# Patient Record
Sex: MALE | Race: WHITE | Employment: OTHER | URBAN - METROPOLITAN AREA
[De-identification: names, ages, dates, MRNs, and addresses within clinical notes are randomized per-mention and may not be internally consistent; named-entity substitution may affect disease eponyms.]

---

## 2020-04-08 ENCOUNTER — APPOINTMENT (OUTPATIENT)
Dept: GENERAL RADIOLOGY | Age: 40
End: 2020-04-08
Payer: COMMERCIAL

## 2020-04-08 ENCOUNTER — HOSPITAL ENCOUNTER (EMERGENCY)
Age: 40
Discharge: LEFT BEFORE DISCHARGE | End: 2020-04-08
Attending: EMERGENCY MEDICINE
Payer: COMMERCIAL

## 2020-04-08 VITALS
RESPIRATION RATE: 18 BRPM | TEMPERATURE: 97.1 F | DIASTOLIC BLOOD PRESSURE: 94 MMHG | OXYGEN SATURATION: 98 % | HEART RATE: 102 BPM | SYSTOLIC BLOOD PRESSURE: 133 MMHG

## 2020-04-08 LAB
ACETAMINOPHEN LEVEL: <5 MCG/ML (ref 10–30)
ALBUMIN SERPL-MCNC: 4.2 G/DL (ref 3.5–5.2)
ALP BLD-CCNC: 67 U/L (ref 40–129)
ALT SERPL-CCNC: 47 U/L (ref 0–40)
AMPHETAMINE SCREEN, URINE: NOT DETECTED
ANION GAP SERPL CALCULATED.3IONS-SCNC: 12 MMOL/L (ref 7–16)
AST SERPL-CCNC: 24 U/L (ref 0–39)
BARBITURATE SCREEN URINE: NOT DETECTED
BASOPHILS ABSOLUTE: 0.06 E9/L (ref 0–0.2)
BASOPHILS RELATIVE PERCENT: 0.5 % (ref 0–2)
BENZODIAZEPINE SCREEN, URINE: NOT DETECTED
BILIRUB SERPL-MCNC: 0.3 MG/DL (ref 0–1.2)
BUN BLDV-MCNC: 9 MG/DL (ref 6–20)
CALCIUM SERPL-MCNC: 9.4 MG/DL (ref 8.6–10.2)
CANNABINOID SCREEN URINE: NOT DETECTED
CHLORIDE BLD-SCNC: 103 MMOL/L (ref 98–107)
CO2: 23 MMOL/L (ref 22–29)
COCAINE METABOLITE SCREEN URINE: NOT DETECTED
CREAT SERPL-MCNC: 0.8 MG/DL (ref 0.7–1.2)
EOSINOPHILS ABSOLUTE: 0.03 E9/L (ref 0.05–0.5)
EOSINOPHILS RELATIVE PERCENT: 0.2 % (ref 0–6)
ETHANOL: <10 MG/DL (ref 0–0.08)
FENTANYL SCREEN, URINE: POSITIVE
GFR AFRICAN AMERICAN: >60
GFR NON-AFRICAN AMERICAN: >60 ML/MIN/1.73
GLUCOSE BLD-MCNC: 79 MG/DL (ref 74–99)
HCT VFR BLD CALC: 42.9 % (ref 37–54)
HEMOGLOBIN: 15 G/DL (ref 12.5–16.5)
IMMATURE GRANULOCYTES #: 0.09 E9/L
IMMATURE GRANULOCYTES %: 0.7 % (ref 0–5)
LYMPHOCYTES ABSOLUTE: 1.75 E9/L (ref 1.5–4)
LYMPHOCYTES RELATIVE PERCENT: 14.2 % (ref 20–42)
Lab: ABNORMAL
MCH RBC QN AUTO: 30.2 PG (ref 26–35)
MCHC RBC AUTO-ENTMCNC: 35 % (ref 32–34.5)
MCV RBC AUTO: 86.3 FL (ref 80–99.9)
METHADONE SCREEN, URINE: NOT DETECTED
MONOCYTES ABSOLUTE: 0.84 E9/L (ref 0.1–0.95)
MONOCYTES RELATIVE PERCENT: 6.8 % (ref 2–12)
NEUTROPHILS ABSOLUTE: 9.52 E9/L (ref 1.8–7.3)
NEUTROPHILS RELATIVE PERCENT: 77.6 % (ref 43–80)
OPIATE SCREEN URINE: POSITIVE
OXYCODONE URINE: NOT DETECTED
PDW BLD-RTO: 11.5 FL (ref 11.5–15)
PHENCYCLIDINE SCREEN URINE: NOT DETECTED
PLATELET # BLD: 341 E9/L (ref 130–450)
PMV BLD AUTO: 9.3 FL (ref 7–12)
POTASSIUM SERPL-SCNC: 4.4 MMOL/L (ref 3.5–5)
RBC # BLD: 4.97 E12/L (ref 3.8–5.8)
SALICYLATE, SERUM: <0.3 MG/DL (ref 0–30)
SODIUM BLD-SCNC: 138 MMOL/L (ref 132–146)
TOTAL PROTEIN: 7 G/DL (ref 6.4–8.3)
TRICYCLIC ANTIDEPRESSANTS SCREEN SERUM: NEGATIVE NG/ML
TROPONIN: <0.01 NG/ML (ref 0–0.03)
WBC # BLD: 12.3 E9/L (ref 4.5–11.5)

## 2020-04-08 PROCEDURE — 93005 ELECTROCARDIOGRAM TRACING: CPT | Performed by: EMERGENCY MEDICINE

## 2020-04-08 PROCEDURE — 80307 DRUG TEST PRSMV CHEM ANLYZR: CPT

## 2020-04-08 PROCEDURE — 80053 COMPREHEN METABOLIC PANEL: CPT

## 2020-04-08 PROCEDURE — 99284 EMERGENCY DEPT VISIT MOD MDM: CPT

## 2020-04-08 PROCEDURE — 96360 HYDRATION IV INFUSION INIT: CPT

## 2020-04-08 PROCEDURE — 71045 X-RAY EXAM CHEST 1 VIEW: CPT

## 2020-04-08 PROCEDURE — G0480 DRUG TEST DEF 1-7 CLASSES: HCPCS

## 2020-04-08 PROCEDURE — 85025 COMPLETE CBC W/AUTO DIFF WBC: CPT

## 2020-04-08 PROCEDURE — 84484 ASSAY OF TROPONIN QUANT: CPT

## 2020-04-08 PROCEDURE — 2580000003 HC RX 258: Performed by: EMERGENCY MEDICINE

## 2020-04-08 RX ORDER — 0.9 % SODIUM CHLORIDE 0.9 %
1000 INTRAVENOUS SOLUTION INTRAVENOUS ONCE
Status: COMPLETED | OUTPATIENT
Start: 2020-04-08 | End: 2020-04-08

## 2020-04-08 RX ADMIN — SODIUM CHLORIDE 1000 ML: 9 INJECTION, SOLUTION INTRAVENOUS at 21:49

## 2020-04-09 LAB
EKG ATRIAL RATE: 94 BPM
EKG P AXIS: 17 DEGREES
EKG P-R INTERVAL: 142 MS
EKG Q-T INTERVAL: 350 MS
EKG QRS DURATION: 84 MS
EKG QTC CALCULATION (BAZETT): 437 MS
EKG R AXIS: 20 DEGREES
EKG T AXIS: 27 DEGREES
EKG VENTRICULAR RATE: 94 BPM

## 2020-04-09 PROCEDURE — 93010 ELECTROCARDIOGRAM REPORT: CPT | Performed by: INTERNAL MEDICINE

## 2021-01-03 ENCOUNTER — HOSPITAL ENCOUNTER (EMERGENCY)
Age: 41
Discharge: HOME | End: 2021-01-03
Attending: EMERGENCY MEDICINE
Payer: COMMERCIAL

## 2021-01-03 VITALS
OXYGEN SATURATION: 96 % | RESPIRATION RATE: 16 BRPM | DIASTOLIC BLOOD PRESSURE: 89 MMHG | TEMPERATURE: 98.9 F | SYSTOLIC BLOOD PRESSURE: 147 MMHG | HEART RATE: 87 BPM

## 2021-01-03 DIAGNOSIS — T40.601A OPIATE OVERDOSE, ACCIDENTAL OR UNINTENTIONAL, INITIAL ENCOUNTER (HCC): Primary | ICD-10-CM

## 2021-01-03 PROCEDURE — 99284 EMERGENCY DEPT VISIT MOD MDM: CPT

## 2021-01-03 PROCEDURE — 6360000002 HC RX W HCPCS: Performed by: EMERGENCY MEDICINE

## 2021-01-03 PROCEDURE — 96374 THER/PROPH/DIAG INJ IV PUSH: CPT

## 2021-01-03 PROCEDURE — 6370000000 HC RX 637 (ALT 250 FOR IP): Performed by: EMERGENCY MEDICINE

## 2021-01-03 PROCEDURE — 2580000003 HC RX 258: Performed by: EMERGENCY MEDICINE

## 2021-01-03 RX ORDER — CLONIDINE HYDROCHLORIDE 0.1 MG/1
0.1 TABLET ORAL ONCE
Status: COMPLETED | OUTPATIENT
Start: 2021-01-03 | End: 2021-01-03

## 2021-01-03 RX ORDER — ONDANSETRON 2 MG/ML
4 INJECTION INTRAMUSCULAR; INTRAVENOUS ONCE
Status: COMPLETED | OUTPATIENT
Start: 2021-01-03 | End: 2021-01-03

## 2021-01-03 RX ORDER — 0.9 % SODIUM CHLORIDE 0.9 %
500 INTRAVENOUS SOLUTION INTRAVENOUS ONCE
Status: COMPLETED | OUTPATIENT
Start: 2021-01-03 | End: 2021-01-03

## 2021-01-03 RX ADMIN — SODIUM CHLORIDE 500 ML: 9 INJECTION, SOLUTION INTRAVENOUS at 13:26

## 2021-01-03 RX ADMIN — CLONIDINE HYDROCHLORIDE 0.1 MG: 0.1 TABLET ORAL at 13:28

## 2021-01-03 RX ADMIN — ONDANSETRON HYDROCHLORIDE 4 MG: 2 SOLUTION INTRAMUSCULAR; INTRAVENOUS at 13:31

## 2021-01-03 NOTE — ED NOTES
Bed: HE  Expected date:   Expected time:   Means of arrival:   Comments:  Rickie East RN  01/03/21 1357

## 2021-01-03 NOTE — ED PROVIDER NOTES
[facility]  Department of Emergency Medicine   ED  Encounter Note  Admit Date/RoomTime: 1/3/2021 12:21 PM  ED Room: Mirlande Franky    NAME: Ellie Lennon  : 1980  MRN: [id number]     Chief Complaint:  Drug Overdose (overdosed in [place] parking lot, friend took him to get Narcan, woke up being combative. )    History of Present Illness       Ellie Lennon is a 45 y.o. old male who presents to the emergency department for evaluation of opiate overdose. [place] and a friend took him to a another friend's house to obtain Narcan. After he received the Narcan, he became combative and they called 911. On arrival to the ED, he states he feels as though he is withdrawing. He is currently calm and cooperative. He is very diaphoretic and complains of nausea and diffuse muscle aches. He denies suicidal or homicidal ideation. Associated Signs and Symptoms:  diaphoresis. Severity of Symptoms:   Life threatening. Amount Ingested:  unknown. Duration (of Ingestion):  hour(s). Stressors include: nothing new. Prior History of:    []   Anxiety     []   Bipolar Disorder     []   Depression     []   Cocaine Use     []   ETOH Abuse     []   Marijuana Use     []   Previous Suicide Attempt     []   Schizophrenia      Medication Compliance:  on no known medication. Quality:     Hopelessness:   No.     Terror:   No.      Hallucinations:   None. Confusion:   No.      Able to Care for Self:   Yes. Able to Control Self:   Yes. ROS   Pertinent positives and negatives are stated within HPI, all other systems reviewed and are negative. Past Medical History:  has no past medical history on file. Surgical History:  has no past surgical history on file. Social History:  reports that he has been smoking. His smokeless tobacco use includes chew. He reports current drug use. Drug: Opiates . He reports that he does not drink alcohol.     Family History: family history is not on file. Allergies: Ceclor [cefaclor]    Physical Exam   Oxygen Saturation Interpretation: Normal.        ED Triage Vitals [01/03/21 1222]   BP Temp Temp Source Pulse Resp SpO2 Height Weight   (!) 139/95 98.9 °F (37.2 °C) Temporal 95 18 98 % -- --         General Appearance:  street clothes and fair hygiene. Constitutional:   Level of Consciousness: Awake and alert. ETOH: No.          Distress: mild. Cooperativeness: cooperative. Eyes:  PERRL, EOMI, no discharge or conjunctival injection. Ears:  External ears without lesions. Throat:  Pharynx without injection, exudate, or tonsillar hypertrophy. Airway patient. Neck:  Normal ROM. Supple. Respiratory:  Clear to auscultation and breath sounds equal.  CV:  Regular rate and rhythm, normal heart sounds, without pathological murmurs, ectopy, gallops, or rubs. GI:  Abdomen Soft, nontender, good bowel sounds. No firm or pulsatile mass. Back:  No costovertebral tenderness. Integument:  Normal turgor. +diaphoretic  Lymphatics: No lymphangitis or adenopathy noted. Neurological:  Oriented x 3. Motor functions intact. Psychiatric:        Thought Process:       Coherent:  Yes. Delusions / Paranoia: no evidence of paranoia. Flight of ideas:  No.         Rambling conversation:  No.       Affect: calm. Suicidal ideation:  no suicidal ideation. Homicidal ideation:  no homicidal ideation. Perceptions:  denies any perceptual disturbance present. Insight: average. Judgement:  average. Lab / Imaging Results   (All laboratory and radiology results have been personally reviewed by myself)  Labs:  No results found for this visit on 01/03/21. Imaging: All Radiology results interpreted by Radiologist unless otherwise noted.   No orders to display       ED Course / Medical Decision Making     Medications   cloNIDine (CATAPRES) tablet 0.1 mg (0.1 mg Oral Given 1/3/21 7717) ondansetron (ZOFRAN) injection 4 mg (4 mg Intravenous Given 1/3/21 1331)   0.9 % sodium chloride bolus (0 mLs Intravenous Stopped 1/3/21 1600)        Re-Evaluations:  1/3/21      Patients condition is improving. Consultations:             None    Procedures:   none    MDM: Patient presents to the ED after accidental opiate overdose. He has stable vitals here and is not requiring oxygen. He was given clonidine, Zofran and fluids for overdose symptoms. He was monitored in the ED for several hours with no relapse. He was discharged with a prescription for Narcan. Plan of Care/Counseling:  I reviewed today's visit with the patient in addition to providing specific details for the plan of care and counseling regarding the diagnosis and prognosis. Questions are answered at this time and are agreeable with the plan. Assessment      1. Opiate overdose, accidental or unintentional, initial encounter [facility])      This patient's ED course included: a personal history and physicial examination  This patient has remained hemodynamically stable during their ED course. Plan   Discharge to home. Patient condition is stable. New Medications     Discharge Medication List as of 1/3/2021  3:21 PM      START taking these medications    Details   Naloxone HCl (NALOXONE OPIATE OVERDOSE KIT) 1 each by Nasal route once for 1 dose, Disp-1 kit, R-0Print           Electronically signed by Ander Gómez DO   DD: 1/3/21  **This report was transcribed using voice recognition software. Every effort was made to ensure accuracy; however, inadvertent computerized transcription errors may be present.   END OF PROVIDER NOTE        Ander Gómez DO  01/03/21 3607

## 2022-12-27 ENCOUNTER — HOSPITAL ENCOUNTER (EMERGENCY)
Age: 42
Discharge: HOME | End: 2022-12-27
Payer: COMMERCIAL

## 2022-12-27 ENCOUNTER — APPOINTMENT (OUTPATIENT)
Dept: ULTRASOUND IMAGING | Age: 42
End: 2022-12-27
Payer: COMMERCIAL

## 2022-12-27 VITALS
RESPIRATION RATE: 16 BRPM | DIASTOLIC BLOOD PRESSURE: 78 MMHG | WEIGHT: 175 LBS | HEART RATE: 78 BPM | OXYGEN SATURATION: 100 % | SYSTOLIC BLOOD PRESSURE: 142 MMHG | TEMPERATURE: 98.2 F | BODY MASS INDEX: 28.25 KG/M2

## 2022-12-27 DIAGNOSIS — F19.90 IVDU (INTRAVENOUS DRUG USER): ICD-10-CM

## 2022-12-27 DIAGNOSIS — R60.0 BILATERAL LOWER EXTREMITY EDEMA: Primary | ICD-10-CM

## 2022-12-27 LAB
ALBUMIN SERPL-MCNC: 3.7 G/DL (ref 3.5–5.2)
ALP BLD-CCNC: 100 U/L (ref 40–129)
ALT SERPL-CCNC: 29 U/L (ref 0–40)
ANION GAP SERPL CALCULATED.3IONS-SCNC: 9 MMOL/L (ref 7–16)
AST SERPL-CCNC: 25 U/L (ref 0–39)
BASOPHILS ABSOLUTE: 0.05 E9/L (ref 0–0.2)
BASOPHILS RELATIVE PERCENT: 0.6 % (ref 0–2)
BILIRUB SERPL-MCNC: <0.2 MG/DL (ref 0–1.2)
BUN BLDV-MCNC: 11 MG/DL (ref 6–20)
CALCIUM SERPL-MCNC: 9.1 MG/DL (ref 8.6–10.2)
CHLORIDE BLD-SCNC: 102 MMOL/L (ref 98–107)
CO2: 27 MMOL/L (ref 22–29)
CREAT SERPL-MCNC: 0.8 MG/DL (ref 0.7–1.2)
EOSINOPHILS ABSOLUTE: 0.12 E9/L (ref 0.05–0.5)
EOSINOPHILS RELATIVE PERCENT: 1.5 % (ref 0–6)
GFR SERPL CREATININE-BSD FRML MDRD: >60 ML/MIN/1.73
GLUCOSE BLD-MCNC: 122 MG/DL (ref 74–99)
HCT VFR BLD CALC: 37.7 % (ref 37–54)
HEMOGLOBIN: 13.2 G/DL (ref 12.5–16.5)
IMMATURE GRANULOCYTES #: 0.05 E9/L
IMMATURE GRANULOCYTES %: 0.6 % (ref 0–5)
LYMPHOCYTES ABSOLUTE: 3.08 E9/L (ref 1.5–4)
LYMPHOCYTES RELATIVE PERCENT: 39.6 % (ref 20–42)
MCH RBC QN AUTO: 28.9 PG (ref 26–35)
MCHC RBC AUTO-ENTMCNC: 35 % (ref 32–34.5)
MCV RBC AUTO: 82.5 FL (ref 80–99.9)
MONOCYTES ABSOLUTE: 0.62 E9/L (ref 0.1–0.95)
MONOCYTES RELATIVE PERCENT: 8 % (ref 2–12)
NEUTROPHILS ABSOLUTE: 3.85 E9/L (ref 1.8–7.3)
NEUTROPHILS RELATIVE PERCENT: 49.7 % (ref 43–80)
PDW BLD-RTO: 12.4 FL (ref 11.5–15)
PLATELET # BLD: 384 E9/L (ref 130–450)
PMV BLD AUTO: 9.4 FL (ref 7–12)
POTASSIUM SERPL-SCNC: 3.8 MMOL/L (ref 3.5–5)
RBC # BLD: 4.57 E12/L (ref 3.8–5.8)
SODIUM BLD-SCNC: 138 MMOL/L (ref 132–146)
TOTAL PROTEIN: 7.2 G/DL (ref 6.4–8.3)
WBC # BLD: 7.8 E9/L (ref 4.5–11.5)

## 2022-12-27 PROCEDURE — 85025 COMPLETE CBC W/AUTO DIFF WBC: CPT

## 2022-12-27 PROCEDURE — 36415 COLL VENOUS BLD VENIPUNCTURE: CPT

## 2022-12-27 PROCEDURE — 93970 EXTREMITY STUDY: CPT

## 2022-12-27 PROCEDURE — 80053 COMPREHEN METABOLIC PANEL: CPT

## 2022-12-27 PROCEDURE — 99284 EMERGENCY DEPT VISIT MOD MDM: CPT

## 2022-12-27 PROCEDURE — 87040 BLOOD CULTURE FOR BACTERIA: CPT

## 2022-12-27 ASSESSMENT — LIFESTYLE VARIABLES
HOW OFTEN DO YOU HAVE A DRINK CONTAINING ALCOHOL: NEVER
HOW MANY STANDARD DRINKS CONTAINING ALCOHOL DO YOU HAVE ON A TYPICAL DAY: PATIENT DOES NOT DRINK

## 2022-12-27 ASSESSMENT — PAIN DESCRIPTION - FREQUENCY: FREQUENCY: CONTINUOUS

## 2022-12-27 ASSESSMENT — PAIN SCALES - GENERAL: PAINLEVEL_OUTOF10: 10

## 2022-12-27 ASSESSMENT — PAIN DESCRIPTION - ONSET: ONSET: ON-GOING

## 2022-12-27 ASSESSMENT — PAIN DESCRIPTION - LOCATION: LOCATION: LEG

## 2022-12-27 ASSESSMENT — PAIN DESCRIPTION - PAIN TYPE: TYPE: ACUTE PAIN

## 2022-12-27 ASSESSMENT — PAIN DESCRIPTION - ORIENTATION: ORIENTATION: RIGHT;LEFT

## 2022-12-27 ASSESSMENT — PAIN DESCRIPTION - DESCRIPTORS: DESCRIPTORS: ACHING;DISCOMFORT;SORE

## 2022-12-27 ASSESSMENT — PAIN - FUNCTIONAL ASSESSMENT: PAIN_FUNCTIONAL_ASSESSMENT: 0-10

## 2022-12-27 NOTE — Clinical Note
Bayron Rose was seen and treated in our emergency department on 12/27/2022. He may return to work on 12/28/2022. If you have any questions or concerns, please don't hesitate to call.       Megha Fernandez, APRN - CNP

## 2022-12-27 NOTE — ED PROVIDER NOTES
[facility] [address] [facility]  Department of Emergency Medicine   ED  Encounter Note  Admit Date/RoomTime: 2022  1:13 PM  ED Room:     NAME: Paco Loya  : 1980  MRN: [id number]     Chief Complaint:  Leg Pain (Bilateral lower leg swelling, ongoing swelling for a few weeks, and hands have some swelling, does use IV drugs)    History of Present Illness        Paco Loya is a 40 y.o. old male who presents to the emergency department by private vehicle with complaints of a several week history of bilateral lower extremity and bilateral hand swelling with no known injury. Denies any calf pain, leg pain, pain to the hands. Of note he is an IV drug user, last injected approximately 2 days ago, reports he typically uses his feet, ankles, behind the knees, bilateral hands, arms and neck and has occasionally used his groin for injecting. He has been using IV drugs for several years. He does report that he is in treatment. He states that he does not share needles but does not always use clean needles every time nor does he clean his skin every time. He denies any fevers, chills, body aches. He has been wearing compression stockings which have helped with the swelling somewhat. Associated Signs & Symptoms:     []  Fever     []  Cough     []  Dyspnea     []  Hemoptysis     []  Chest Pain     Wells' Score Criteria for DVT: (possible score ? 2 to 9)      Active cancer (treatment within last 6 months or palliative) NO (0)     Calf swelling ?  3 cm compared to asymptomatic calf (measured 10 cm             below tibial tuberosity) NO (0)     Swollen unilateral superficial veins (non-varicose, in symptomatic leg) NO (0)     Unilateral pitting edema (in symptomatic leg): NO (0)     Previous documented DVT: NO (0)     Swelling of entire leg NO (0)      Localized tenderness along the deep venous system NO (0)     Paralysis, paresis, or recent cast immobilization of lower extremities NO (0)     Recently bedridden ? 3 days, or major surgery requiring regional or                    general anesthetic in the past 12 weeks NO (0)     Alternative diagnosis at least as likely YES (-2)     TOTAL SCORE -2     * Those with Wells scores of two or more have a 28% chance of having DVT, those with a lower score have 6% odds. ** Alternatively, Wells scores can be categorized as high if greater than two, moderate if one or two, and low if less than one, with likelihoods of 53%, 17%, and 5 respectively. ROS   Pertinent positives and negatives are stated within HPI, all other systems reviewed and are negative. Past Medical History:  has no past medical history on file. Surgical History:  has a past surgical history that includes Hip fracture surgery (Right). Social History:  reports that he has been smoking cigarettes. He started smoking about 29 years ago. He has a 25.00 pack-year smoking history. He has never used smokeless tobacco. He reports current drug use. Drugs: Opiates  and IV. He reports that he does not drink alcohol. Family History: family history is not on file. Allergies: Ceclor [cefaclor]    Physical Exam   Oxygen Saturation Interpretation: Normal.        ED Triage Vitals   BP Temp Temp src Heart Rate Resp SpO2 Height Weight   12/27/22 1057 12/27/22 1057 -- 12/27/22 1057 12/27/22 1057 12/27/22 1057 -- 12/27/22 1131   (!) 136/90 98.2 °F (36.8 °C)  85 16 99 %  175 lb (79.4 kg)         Constitutional:  Alert, development consistent with age. HEENT:  NC/NT. Airway patent. Neck:  Normal ROM. Supple. Respiratory:  Clear to auscultation and breath sounds equal.  CV:  Regular rate and rhythm, normal heart sounds, without pathological murmurs, ectopy, gallops, or rubs. GI:  Abdomen Soft, nontender, good bowel sounds. No firm or pulsatile mass. Lower Ext.:  Bilateral:              Tenderness: None. Swelling: Mild.               Deformity: no. ROM: full range of motion. Calf:  bilateral, No evidence of DVT seen on physical exam. Negative Nadir's sign. No cords or calf tenderness. No significant calf/ankle edema. .            Edema:  Trace and none Bilateral lower extremity(s). Skin: Multiple injection sites in various stages of healing to the toes, dorsal feet, ankles, calves, popliteal space. No signs or symptoms of cellulitis, no redness, no abscess formation. Distal Function:              Motor deficit: none. Sensory deficit: none. Pulse deficit: none. Capillary refill: normal.  Gait: normal  Integument:  Normal turgor. Warm, dry, without visible rash, unless noted elsewhere. Neurological:  Oriented. Motor functions intact.     Lab / Imaging Results   (All laboratory and radiology results have been personally reviewed by myself)  Labs:  Results for orders placed or performed during the hospital encounter of 12/27/22   CBC with Auto Differential   Result Value Ref Range    WBC 7.8 4.5 - 11.5 E9/L    RBC 4.57 3.80 - 5.80 E12/L    Hemoglobin 13.2 12.5 - 16.5 g/dL    Hematocrit 37.7 37.0 - 54.0 %    MCV 82.5 80.0 - 99.9 fL    MCH 28.9 26.0 - 35.0 pg    MCHC 35.0 (H) 32.0 - 34.5 %    RDW 12.4 11.5 - 15.0 fL    Platelets 430 189 - 520 E9/L    MPV 9.4 7.0 - 12.0 fL    Neutrophils % 49.7 43.0 - 80.0 %    Immature Granulocytes % 0.6 0.0 - 5.0 %    Lymphocytes % 39.6 20.0 - 42.0 %    Monocytes % 8.0 2.0 - 12.0 %    Eosinophils % 1.5 0.0 - 6.0 %    Basophils % 0.6 0.0 - 2.0 %    Neutrophils Absolute 3.85 1.80 - 7.30 E9/L    Immature Granulocytes # 0.05 E9/L    Lymphocytes Absolute 3.08 1.50 - 4.00 E9/L    Monocytes Absolute 0.62 0.10 - 0.95 E9/L    Eosinophils Absolute 0.12 0.05 - 0.50 E9/L    Basophils Absolute 0.05 0.00 - 0.20 E9/L   CMP   Result Value Ref Range    Sodium 138 132 - 146 mmol/L    Potassium 3.8 3.5 - 5.0 mmol/L    Chloride 102 98 - 107 mmol/L    CO2 27 22 - 29 mmol/L Anion Gap 9 7 - 16 mmol/L    Glucose 122 (H) 74 - 99 mg/dL    BUN 11 6 - 20 mg/dL    Creatinine 0.8 0.7 - 1.2 mg/dL    Est, Glom Filt Rate >60 >=60 mL/min/1.73    Calcium 9.1 8.6 - 10.2 mg/dL    Total Protein 7.2 6.4 - 8.3 g/dL    Albumin 3.7 3.5 - 5.2 g/dL    Total Bilirubin <0.2 0.0 - 1.2 mg/dL    Alkaline Phosphatase 100 40 - 129 U/L    ALT 29 0 - 40 U/L    AST 25 0 - 39 U/L       Imaging: All Radiology results interpreted by Radiologist unless otherwise noted. US DUP LOWER EXTREMITIES BILATERAL VENOUS   Final Result   No evidence of DVT in either lower extremity. ED Course / Medical Decision Making   Medications - No data to display     Consult(s):   None    Procedure(s):   None    MDM: This is a 45-year-old male with a past medical history of opioid dependence with long term IV drug use who presents with a several week history of bilateral lower extremity swelling and bilateral hand swelling without pain. He reports that he was put on Bactrim by the methadone clinic which she has been taking. He has not had any open areas or redness to any of his injection sites. He has not had any fevers. He denies any calf pain. On exam he does have multiple injection and skin popping sites to his feet, ankles, calves, arms, neck all without signs or symptoms of secondary infection. Ultrasound of bilateral lower extremities were negative for DVT, he has no leukocytosis noted on his CBC. At abundance of caution blood cultures were drawn and sent to lab, advised patient this may take 48 to 72 hours to return. Patient is afebrile and nontoxic in appearance, there are no signs or symptoms of a skin or soft tissue infection at this time. Patient was advised that the swelling may likely be due to vascular damage given his frequent IV drug use and poor venous return.   Patient was advised he will need to follow-up with his PCP and as he is not established information was given for the same [facility] as he may require further vascular studies should the swelling continue. In the interim he may continue to use the compression stockings and elevate his feet. He was counseled on continuing with his drug rehab program and avoidance of injection drug use. In the very least I did advise patient that should he need to continue to inject drugs he should clean the injection sites thoroughly and use clean needles every time. Patient verbalizes understanding of this and is agreeable to discharge home. Patient was explicitly instructed on specific signs and symptoms on which to return to the emergency room for. Patient was instructed to return to the ER for any new or worsening symptoms. Additional discharge instructions were given verbally. All questions were answered. Patient is comfortable and agreeable with discharge plan. Patient in no acute distress and non-toxic in appearance. Plan of Care/Counseling:  LIEN Hoang CNP reviewed today's visit with the patient in addition to providing specific details for the plan of care and counseling regarding the diagnosis and prognosis. Questions are answered at this time and are agreeable with the plan. Assessment      1. Bilateral lower extremity edema    2. IVDU (intravenous drug user)      Plan   Discharged home. Patient condition is good    New Medications     New Prescriptions    No medications on file     Electronically signed by LIEN Hoang CNP   DD: 12/27/22  **This report was transcribed using voice recognition software. Every effort was made to ensure accuracy; however, inadvertent computerized transcription errors may be present.   END OF ED PROVIDER NOTE         LIEN Hoang CNP  12/27/22 2401

## 2022-12-27 NOTE — DISCHARGE INSTRUCTIONS
Your ultrasound of your legs was negative for a blood clot, there is no sign of infection based on your blood work. Your blood cultures will be pending, you will be called if there are any positive result. The swelling in your legs is likely due to poor venous return related to the damage to the blood vessels. Please try to keep your legs elevated and you may continue to use the compression socks. Please keep up the good work with your recovery treatment.

## 2023-01-01 LAB
BLOOD CULTURE, ROUTINE: NORMAL
BLOOD CULTURE, ROUTINE: NORMAL

## 2023-02-25 ENCOUNTER — HOSPITAL ENCOUNTER (EMERGENCY)
Age: 43
Discharge: HOME | End: 2023-02-25
Attending: EMERGENCY MEDICINE
Payer: COMMERCIAL

## 2023-02-25 ENCOUNTER — APPOINTMENT (OUTPATIENT)
Dept: GENERAL RADIOLOGY | Age: 43
End: 2023-02-25
Payer: COMMERCIAL

## 2023-02-25 VITALS
TEMPERATURE: 98.3 F | RESPIRATION RATE: 18 BRPM | HEIGHT: 66 IN | WEIGHT: 175 LBS | DIASTOLIC BLOOD PRESSURE: 82 MMHG | BODY MASS INDEX: 28.12 KG/M2 | SYSTOLIC BLOOD PRESSURE: 130 MMHG | OXYGEN SATURATION: 98 % | HEART RATE: 86 BPM

## 2023-02-25 DIAGNOSIS — R07.89 ATYPICAL CHEST PAIN: Primary | ICD-10-CM

## 2023-02-25 LAB
ALBUMIN SERPL-MCNC: 4.4 G/DL (ref 3.5–5.2)
ALP BLD-CCNC: 128 U/L (ref 40–129)
ALT SERPL-CCNC: 40 U/L (ref 0–40)
ANION GAP SERPL CALCULATED.3IONS-SCNC: 11 MMOL/L (ref 7–16)
AST SERPL-CCNC: 28 U/L (ref 0–39)
BASOPHILS ABSOLUTE: 0.07 E9/L (ref 0–0.2)
BASOPHILS RELATIVE PERCENT: 1.2 % (ref 0–2)
BILIRUB SERPL-MCNC: 0.3 MG/DL (ref 0–1.2)
BUN BLDV-MCNC: 13 MG/DL (ref 6–20)
CALCIUM SERPL-MCNC: 9.2 MG/DL (ref 8.6–10.2)
CHLORIDE BLD-SCNC: 103 MMOL/L (ref 98–107)
CO2: 26 MMOL/L (ref 22–29)
CREAT SERPL-MCNC: 0.9 MG/DL (ref 0.7–1.2)
EKG ATRIAL RATE: 82 BPM
EKG P AXIS: 38 DEGREES
EKG P-R INTERVAL: 134 MS
EKG Q-T INTERVAL: 406 MS
EKG QRS DURATION: 82 MS
EKG QTC CALCULATION (BAZETT): 474 MS
EKG R AXIS: 1 DEGREES
EKG T AXIS: 25 DEGREES
EKG VENTRICULAR RATE: 82 BPM
EOSINOPHILS ABSOLUTE: 0.14 E9/L (ref 0.05–0.5)
EOSINOPHILS RELATIVE PERCENT: 2.4 % (ref 0–6)
GFR SERPL CREATININE-BSD FRML MDRD: >60 ML/MIN/1.73
GLUCOSE BLD-MCNC: 88 MG/DL (ref 74–99)
HCT VFR BLD CALC: 47 % (ref 37–54)
HEMOGLOBIN: 15.8 G/DL (ref 12.5–16.5)
IMMATURE GRANULOCYTES #: 0.02 E9/L
IMMATURE GRANULOCYTES %: 0.3 % (ref 0–5)
INR BLD: 1
LYMPHOCYTES ABSOLUTE: 2.78 E9/L (ref 1.5–4)
LYMPHOCYTES RELATIVE PERCENT: 47 % (ref 20–42)
MCH RBC QN AUTO: 28.8 PG (ref 26–35)
MCHC RBC AUTO-ENTMCNC: 33.6 % (ref 32–34.5)
MCV RBC AUTO: 85.6 FL (ref 80–99.9)
MONOCYTES ABSOLUTE: 0.5 E9/L (ref 0.1–0.95)
MONOCYTES RELATIVE PERCENT: 8.5 % (ref 2–12)
NEUTROPHILS ABSOLUTE: 2.4 E9/L (ref 1.8–7.3)
NEUTROPHILS RELATIVE PERCENT: 40.6 % (ref 43–80)
PDW BLD-RTO: 12.6 FL (ref 11.5–15)
PLATELET # BLD: 318 E9/L (ref 130–450)
PMV BLD AUTO: 9.8 FL (ref 7–12)
POTASSIUM REFLEX MAGNESIUM: 4.3 MMOL/L (ref 3.5–5)
PROTHROMBIN TIME: 11.1 SEC (ref 9.3–12.4)
RBC # BLD: 5.49 E12/L (ref 3.8–5.8)
SODIUM BLD-SCNC: 140 MMOL/L (ref 132–146)
TOTAL PROTEIN: 8.5 G/DL (ref 6.4–8.3)
TROPONIN, HIGH SENSITIVITY: 8 NG/L (ref 0–11)
TROPONIN, HIGH SENSITIVITY: <6 NG/L (ref 0–11)
WBC # BLD: 5.9 E9/L (ref 4.5–11.5)

## 2023-02-25 PROCEDURE — 85610 PROTHROMBIN TIME: CPT

## 2023-02-25 PROCEDURE — 93005 ELECTROCARDIOGRAM TRACING: CPT | Performed by: PHYSICIAN ASSISTANT

## 2023-02-25 PROCEDURE — 36415 COLL VENOUS BLD VENIPUNCTURE: CPT

## 2023-02-25 PROCEDURE — 71046 X-RAY EXAM CHEST 2 VIEWS: CPT

## 2023-02-25 PROCEDURE — 84484 ASSAY OF TROPONIN QUANT: CPT

## 2023-02-25 PROCEDURE — 80053 COMPREHEN METABOLIC PANEL: CPT

## 2023-02-25 PROCEDURE — 85025 COMPLETE CBC W/AUTO DIFF WBC: CPT

## 2023-02-25 PROCEDURE — 99285 EMERGENCY DEPT VISIT HI MDM: CPT

## 2023-02-25 RX ORDER — DOXYCYCLINE HYCLATE 100 MG
100 TABLET ORAL 2 TIMES DAILY
Qty: 14 TABLET | Refills: 0 | Status: SHIPPED | OUTPATIENT
Start: 2023-02-25 | End: 2023-03-04

## 2023-02-25 RX ORDER — ASPIRIN 81 MG/1
81 TABLET ORAL DAILY
Qty: 30 TABLET | Refills: 5 | Status: SHIPPED | OUTPATIENT
Start: 2023-02-25

## 2023-02-25 ASSESSMENT — PAIN DESCRIPTION - LOCATION: LOCATION: CHEST

## 2023-02-25 ASSESSMENT — PAIN DESCRIPTION - ORIENTATION: ORIENTATION: MID

## 2023-02-25 ASSESSMENT — PAIN SCALES - GENERAL: PAINLEVEL_OUTOF10: 6

## 2023-02-25 ASSESSMENT — PAIN - FUNCTIONAL ASSESSMENT: PAIN_FUNCTIONAL_ASSESSMENT: 0-10

## 2023-02-25 NOTE — ED NOTES
Ultrasound IV attempted. Pt stated the IV was hitting a nerve and wanted IV removed. Dr. Cheryl Puga notified.       Mylene Aguero RN  02/25/23 6619

## 2023-02-25 NOTE — ED NOTES
Pt upset with this RN for flushing his IV too fast. He states it doesn't hurt, but doesn't want this RN to blow his IV. The IV flushes easily with no resistance.       Cy Grace, TRINO  02/25/23 0566

## 2023-02-25 NOTE — ED PROVIDER NOTES
[address]        Pt Name: Pedro Pablo Grace  MRN: [id number]  [place] 1980  Date of evaluation: 2/25/2023  Provider: Milvia Licona DO  PCP: No primary care provider on file. Note Started: 3:37 PM EST 2/25/23    CHIEF COMPLAINT       Chief Complaint   Patient presents with    Chest Pain     Middle chest pain that started last night. Hector n/v/d. C/O SOB. HISTORY OF PRESENT ILLNESS: 1 or more Elements   History From: Patient    Limitations to history : None    Pedro Pablo Grace is a 40 y.o. male who presents with concern for chest pain. He notes that last night he began when he was sitting there for approximately 2 hours, it was midsternal and nonradiating in nature. This morning it occurred again when he was sitting there. Not associate with shortness of breath, no pain or swelling in his lower extremities aside from an abscessed area on his right calf that he was recently prescribed Bactrim for but has not started taking it. He notes he does have a long history of IV drug use for at least the past 18 years. He does admit to daily use of heroin and meth. He has not been having fevers or chills, he does not believe he has ever had an echo performed. No other acute complaints. Nursing Notes were all reviewed and agreed with or any disagreements were addressed in the HPI. REVIEW OF SYSTEMS :      Positives and Pertinent negatives as per HPI. SURGICAL HISTORY     Past Surgical History:   Procedure Laterality Date    HIP FRACTURE SURGERY Right        CURRENTMEDICATIONS       Discharge Medication List as of 2/25/2023  6:37 PM        CONTINUE these medications which have NOT CHANGED    Details   methadone 10 MG/5ML solution 85 mg. Historical Med      mupirocin (BACTROBAN) 2 % ointment Historical Med             ALLERGIES     Ceclor [cefaclor]    FAMILYHISTORY     History reviewed. No pertinent family history. SOCIAL HISTORY       Social History     Tobacco Use    Smoking status: Every Day     Packs/day: 1.00     Years: 25.00     Pack years: 25.00     Types: Cigarettes     Start date: 1994    Smokeless tobacco: Never   Vaping Use    Vaping Use: Never used   Substance Use Topics    Alcohol use: No    Drug use: Yes     Types: Opiates , IV     Comment: (-) x6 months heroin for about 20 yrs       SCREENINGS        Bertin Coma Scale  Eye Opening: Spontaneous  Best Verbal Response: Oriented  Best Motor Response: Obeys commands  Barrington Coma Scale Score: 15                CIWA Assessment  BP: 130/82  Heart Rate: 86           PHYSICAL EXAM  1 or more Elements     ED Triage Vitals   BP Temp Temp src Heart Rate Resp SpO2 Height Weight   02/25/23 1052 02/25/23 1041 -- 02/25/23 1041 02/25/23 1041 02/25/23 1041 02/25/23 1052 02/25/23 1052   132/72 98.7 °F (37.1 °C)  88 18 100 % 5' 6\" (1.676 m) 175 lb (79.4 kg)       Constitutional/General: Alert and oriented x3  Head: Normocephalic and atraumatic  Eyes: PERRL, EOMI, conjunctiva normal, sclera non icteric  ENT:  Oropharynx clear, handling secretions  Neck: Supple, full ROM, no stridor  Respiratory: Lungs clear to auscultation bilaterally, no wheezes, rales, or rhonchi. Not in respiratory distress  Cardiovascular:  Regular rate. Regular rhythm. 2+ distal pulses. Equal extremity pulses. Chest: No chest wall tenderness  GI:  Abdomen Soft, Non tender, Non distended. No rebound, guarding, or rigidity. Musculoskeletal: Moves all extremities x 4. Warm and well perfused, no cyanosis, no edema. Capillary refill <3 seconds  Integument: skin warm and dry. Abscessed, firm, erythematous area right posterior calf.   Neurologic: GCS 15, no focal deficits, symmetric strength 5/5 in the upper and lower extremities bilaterally  Psychiatric: Normal Affect      DIAGNOSTIC RESULTS   LABS:    Labs Reviewed   CBC WITH AUTO DIFFERENTIAL - Abnormal; Notable for the following components:       Result Value    Neutrophils % 40.6 (*)     Lymphocytes % 47.0 (*)     All other components within normal limits   COMPREHENSIVE METABOLIC PANEL W/ REFLEX TO MG FOR LOW K - Abnormal; Notable for the following components: Total Protein 8.5 (*)     All other components within normal limits   TROPONIN   PROTIME-INR   TROPONIN       As interpreted by me, the above displayed labs are abnormal. All other labs obtained during this visit were within normal range or not returned as of this dictation. RADIOLOGY:   Non-plain film images such as CT, Ultrasound and MRI are read by the radiologist. Plain radiographic images are visualized and preliminarily interpreted by the ED Provider with the below findings:    Interpretation per the Radiologist below, if available at the time of this note:    XR CHEST (2 VW)   Final Result   No acute process. XR CHEST (2 VW)    Result Date: 2/25/2023  EXAMINATION: TWO XRAY VIEWS OF THE CHEST 2/25/2023 12:00 pm COMPARISON: 04/08/2020 HISTORY: ORDERING SYSTEM PROVIDED HISTORY: Shortness of breath TECHNOLOGIST PROVIDED HISTORY: Reason for exam:->Shortness of breath What reading provider will be dictating this exam?->CRC FINDINGS: The lungs are without acute focal process. There is no effusion or pneumothorax. The cardiomediastinal silhouette is without acute process. The osseous structures are without acute process. No acute process. No results found. PROCEDURES   Unless otherwise noted below, none    PAST MEDICAL HISTORY/Chronic Conditions Affecting Care      has no past medical history on file.      EMERGENCY DEPARTMENT COURSE    Vitals:    Vitals:    02/25/23 1041 02/25/23 1052 02/25/23 1903   BP:  132/72 130/82   Pulse: 88 88 86   Resp: 18 16 18   Temp: 98.7 °F (37.1 °C) 98.7 °F (37.1 °C) 98.3 °F (36.8 °C)   TempSrc:   Oral   SpO2: 100% 100% 98%   Weight:  175 lb (79.4 kg)    Height:  5' 6\" (1.676 m)        Patient was given the following medications:  Medications - No data to display    Medical Decision Making/Differential Diagnosis:  CC/HPI Summary, Social Determinants of health, Records Reviewed, DDx, testing done/not done, ED Course, Reassessment, disposition considerations/shared decision making with patient, consults, disposition:        CC/HPI Summary, DDx, ED Course, Reassessment, Tests Considered, Patient expectation:   42-year-old gentleman with past medical history of IV substance abuse presenting today with concern for midsternal chest pain.  It is an ongoing on and off since last evening, the episodes last approximately 2 hours, nothing makes it better or worse, not associated with nausea vomiting or shortness of breath.  No pain or swelling in his legs.  Physical exam reassuring as patient is in no acute distress, nontoxic-appearing, no evidence of splinter hemorrhages.  Differential diagnosis to include not limited to ACS, pneumonia, sepsis, arrhythmia.  EKG without acute ST elevation or other concerning rhythm changes, troponin unremarkable, no changes in electrolytes or kidney liver function, no leukocytosis or anemia.  Chest x-ray without acute cardiopulmonary process.  Without acute cardiac pathology, encouraged further outpatient cardiac management for potential echo and he is understanding of plan.  He remained stable, afebrile, was able to ambulate without becoming distressed.  Overall well-appearing and nonseptic.  Not tachycardic.  Encouraged very strict return precautions and he is understanding of plan, will change antibiotics that he is supposed to start from Bactrim to doxycycline.  Will start on p.o. baby aspirin.  Encouraged outpatient follow-up.    ED Course as of 02/26/23 0025   Sat Feb 25, 2023   1537 EKG:  This EKG is signed and interpreted by me.    Rate: 82  Rhythm: Sinus  Interpretation: no acute changes, no acute ST elevations, nonspecific ST changes, normal axis, QTc is 474  Comparison: stable as compared to patient's most recent EKG on  4/8/2020   [MM]   4045 Chest x-ray as interpreted by me with no acute pneumothorax. Radiology confirming no acute cardiopulmonary process. [MM]   1625 Peripheral Line Placement Procedure Note    Indication: Poor peripheral access, lack of vascular access    Consent: The patient provided verbal consent for this procedure. Procedure: The patient was positioned appropriately and the skin over the left arm was prepped with alcohol. A tourniquet was placed proximal to the vein's location and ultrasound was utilized to identify the vein. A 20 gauge angiocath was inserted into the vein with ultrasound guidance, with the catheter being visualized as it was advanced within the vein. Blood was obtained from the venous access site if needed and the site was secured with a leur lock and a tegaderm adhesive bandage. The patient tolerated the procedure well. Complications: None    Procedure performed by Best Brooke DO, DO at 2/25/2023 at 4:26 PM   [MM]      ED Course User Index  [MM] Best Brooke DO      CONSULTS: (Who and What was discussed)  None    FINAL IMPRESSION      1. Atypical chest pain          DISPOSITION/PLAN     DISPOSITION Decision To Discharge 02/25/2023 06:56:19 PM      PATIENT REFERRED TO:  [facility]) PCP  [phone]  Call   follow up    Ben Gaytan MD  [address] [place] [address]  [phone]    Call   follow up    DISCHARGE MEDICATIONS:  Discharge Medication List as of 2/25/2023  6:37 PM        START taking these medications    Details   aspirin EC 81 MG EC tablet Take 1 tablet by mouth daily, Disp-30 tablet, R-5Print      doxycycline hyclate (VIBRA-TABS) 100 MG tablet Take 1 tablet by mouth 2 times daily for 7 days, Disp-14 tablet, R-0Print                  (Please note that portions of this note were completed with a voice recognition program.  Efforts were made to edit the dictations but occasionally words are mis-transcribed. )    Best Brooke DO (electronically signed)            Cesia Kumar DO  Resident  02/26/23 5929

## 2023-02-25 NOTE — ED NOTES
2 Attempts made for blood work from pt. Pt refused on 3rd attempt, pt put back in waiting room.       Bette Net  02/25/23 1114

## 2023-02-25 NOTE — ED NOTES
Pt maintained Spo2 of 97%, HR 86 while ambulating. Tolerated well without any difficulties.       Marisa Espinal RN  02/25/23 [address], RN  02/25/23 2107

## 2023-02-25 NOTE — ED NOTES
Attempted to obtain blood twice. Will try again with US.       Jered Velásquez, TRINO  02/25/23 4877

## 2023-02-27 LAB
EKG ATRIAL RATE: 82 BPM
EKG P AXIS: 38 DEGREES
EKG P-R INTERVAL: 134 MS
EKG Q-T INTERVAL: 406 MS
EKG QRS DURATION: 82 MS
EKG QTC CALCULATION (BAZETT): 474 MS
EKG R AXIS: 1 DEGREES
EKG T AXIS: 25 DEGREES
EKG VENTRICULAR RATE: 82 BPM

## 2023-02-27 PROCEDURE — 93010 ELECTROCARDIOGRAM REPORT: CPT | Performed by: INTERNAL MEDICINE

## 2023-04-05 ENCOUNTER — HOSPITAL ENCOUNTER (EMERGENCY)
Age: 43
Discharge: HOME | End: 2023-04-05
Attending: EMERGENCY MEDICINE
Payer: COMMERCIAL

## 2023-04-05 VITALS
WEIGHT: 180 LBS | RESPIRATION RATE: 16 BRPM | SYSTOLIC BLOOD PRESSURE: 145 MMHG | DIASTOLIC BLOOD PRESSURE: 86 MMHG | HEART RATE: 108 BPM | TEMPERATURE: 98.5 F | BODY MASS INDEX: 28.93 KG/M2 | OXYGEN SATURATION: 93 % | HEIGHT: 66 IN

## 2023-04-05 DIAGNOSIS — M79.605 ACUTE LEG PAIN, LEFT: Primary | ICD-10-CM

## 2023-04-05 DIAGNOSIS — F11.20 PATIENT ON METHADONE MAINTENANCE THERAPY (HCC): ICD-10-CM

## 2023-04-05 PROCEDURE — 6360000002 HC RX W HCPCS: Performed by: EMERGENCY MEDICINE

## 2023-04-05 RX ORDER — ENOXAPARIN SODIUM 100 MG/ML
1 INJECTION SUBCUTANEOUS ONCE
Status: COMPLETED | OUTPATIENT
Start: 2023-04-05 | End: 2023-04-05

## 2023-04-05 RX ADMIN — ENOXAPARIN SODIUM 80 MG: 100 INJECTION SUBCUTANEOUS at 01:58

## 2023-04-05 ASSESSMENT — LIFESTYLE VARIABLES
HOW MANY STANDARD DRINKS CONTAINING ALCOHOL DO YOU HAVE ON A TYPICAL DAY: PATIENT DOES NOT DRINK
HOW OFTEN DO YOU HAVE A DRINK CONTAINING ALCOHOL: NEVER

## 2023-04-05 ASSESSMENT — PAIN DESCRIPTION - PAIN TYPE: TYPE: ACUTE PAIN

## 2023-04-05 ASSESSMENT — PAIN DESCRIPTION - FREQUENCY: FREQUENCY: CONTINUOUS

## 2023-04-05 ASSESSMENT — PAIN DESCRIPTION - DESCRIPTORS: DESCRIPTORS: ACHING

## 2023-04-05 ASSESSMENT — PAIN - FUNCTIONAL ASSESSMENT: PAIN_FUNCTIONAL_ASSESSMENT: 0-10

## 2023-04-05 ASSESSMENT — PAIN DESCRIPTION - LOCATION: LOCATION: LEG

## 2023-04-05 ASSESSMENT — PAIN DESCRIPTION - ORIENTATION: ORIENTATION: LEFT

## 2023-04-05 ASSESSMENT — PAIN SCALES - GENERAL: PAINLEVEL_OUTOF10: 4

## 2023-04-05 ASSESSMENT — PAIN DESCRIPTION - ONSET: ONSET: ON-GOING

## 2023-04-05 NOTE — DISCHARGE INSTRUCTIONS
NOTE:  Make sure to go to [facility] for an outpatient ultrasound test of your leg later on this morning, Wednesday 4/5/2023. The purpose of this test is to rule out a possible blood clot in your leg. This test is positive you will then be sent to the [facility] emergency department for further evaluation.

## 2023-04-05 NOTE — Clinical Note
Renee Sung was seen and treated in our emergency department on 4/5/2023. He may return to work on 04/06/2023. If you have any questions or concerns, please don't hesitate to call.       Shauna Farias MD

## 2023-04-05 NOTE — ED PROVIDER NOTES
HPI:  4/5/23, Time: 1:11 AM EDT        Saúl Mueller is a 40 y.o. male presenting to the ED for left leg swelling, beginning 2-3 days ago. The complaint has been persistent, moderate in severity, and worsened by nothing. He states he is concerned about possible \" blood clots\" his left leg because of changes in appearance and varicose veins which she has seen. Patient denies any calf pain and he denies any history of DVT/PE in the past.  Has not had any chest pain or shortness of breath. No other complaints are reported. Patient does have a history of opioid addiction and is on methadone therapy. He also states he has history of IV drug abuse in the past    Review of Systems:   A complete review of systems was performed and pertinent positives and negatives are stated within HPI, all other systems reviewed and are negative.    --------------------------------------------- PAST HISTORY ---------------------------------------------  Past Medical History:  has no past medical history on file. Past Surgical History:  has a past surgical history that includes Hip fracture surgery (Right). Social History:  reports that he has been smoking cigarettes. He started smoking about 29 years ago. He has a 25.00 pack-year smoking history. He has never used smokeless tobacco. He reports current drug use. Drugs: Opiates  and IV. He reports that he does not drink alcohol. Family History: family history is not on file. The patients home medications have been reviewed. Allergies: Ceclor [cefaclor]    -------------------------------------------------- RESULTS -------------------------------------------------  All laboratory and radiology results have been personally reviewed by myself   LABS:  No results found for this visit on 04/05/23.     RADIOLOGY:  Interpreted by Radiologist.  No orders to display       ------------------------- NURSING NOTES AND VITALS REVIEWED ---------------------------   The nursing

## 2023-04-05 NOTE — ED NOTES
Patient given order req for ultrasound of leg to get done at [facility], patient verbalized understanding of importance.       Kelby Montoya RN  04/05/23 5204

## 2023-04-06 ENCOUNTER — HOSPITAL ENCOUNTER (OUTPATIENT)
Dept: ULTRASOUND IMAGING | Age: 43
Discharge: HOME | End: 2023-04-08
Payer: COMMERCIAL

## 2023-04-06 DIAGNOSIS — M79.605 ACUTE LEG PAIN, LEFT: ICD-10-CM

## 2023-04-06 PROCEDURE — 93971 EXTREMITY STUDY: CPT

## 2023-05-27 ENCOUNTER — APPOINTMENT (OUTPATIENT)
Dept: CT IMAGING | Age: 43
End: 2023-05-27
Payer: COMMERCIAL

## 2023-05-27 ENCOUNTER — HOSPITAL ENCOUNTER (EMERGENCY)
Age: 43
Discharge: HOME | End: 2023-05-27
Attending: EMERGENCY MEDICINE
Payer: COMMERCIAL

## 2023-05-27 VITALS
HEIGHT: 66 IN | OXYGEN SATURATION: 97 % | WEIGHT: 180 LBS | SYSTOLIC BLOOD PRESSURE: 124 MMHG | BODY MASS INDEX: 28.93 KG/M2 | RESPIRATION RATE: 18 BRPM | DIASTOLIC BLOOD PRESSURE: 91 MMHG | HEART RATE: 90 BPM | TEMPERATURE: 98.4 F

## 2023-05-27 DIAGNOSIS — S09.90XA CLOSED HEAD INJURY, INITIAL ENCOUNTER: ICD-10-CM

## 2023-05-27 DIAGNOSIS — T15.02XA FOREIGN BODY IN CORNEA, LEFT EYE, INITIAL ENCOUNTER: Primary | ICD-10-CM

## 2023-05-27 PROCEDURE — 6370000000 HC RX 637 (ALT 250 FOR IP)

## 2023-05-27 PROCEDURE — 6370000000 HC RX 637 (ALT 250 FOR IP): Performed by: EMERGENCY MEDICINE

## 2023-05-27 PROCEDURE — 99284 EMERGENCY DEPT VISIT MOD MDM: CPT

## 2023-05-27 PROCEDURE — 70450 CT HEAD/BRAIN W/O DYE: CPT

## 2023-05-27 RX ORDER — ERYTHROMYCIN 5 MG/G
OINTMENT OPHTHALMIC ONCE
Status: COMPLETED | OUTPATIENT
Start: 2023-05-27 | End: 2023-05-27

## 2023-05-27 RX ORDER — ERYTHROMYCIN 5 MG/G
OINTMENT OPHTHALMIC NIGHTLY
Status: CANCELLED | OUTPATIENT
Start: 2023-05-27 | End: 2023-05-30

## 2023-05-27 RX ORDER — ERYTHROMYCIN 5 MG/G
OINTMENT OPHTHALMIC NIGHTLY
Status: DISCONTINUED | OUTPATIENT
Start: 2023-05-27 | End: 2023-05-27

## 2023-05-27 RX ORDER — TETRACAINE HYDROCHLORIDE 5 MG/ML
1 SOLUTION OPHTHALMIC ONCE
Status: COMPLETED | OUTPATIENT
Start: 2023-05-27 | End: 2023-05-27

## 2023-05-27 RX ADMIN — FLUORESCEIN SODIUM 1 MG: 1 STRIP OPHTHALMIC at 01:15

## 2023-05-27 RX ADMIN — ERYTHROMYCIN: 5 OINTMENT OPHTHALMIC at 03:59

## 2023-05-27 RX ADMIN — TETRACAINE HYDROCHLORIDE 1 DROP: 5 SOLUTION OPHTHALMIC at 01:15

## 2023-05-27 ASSESSMENT — VISUAL ACUITY
OS: 20/30
OD: 20/20
OU: 1
OU: 20/25

## 2023-05-27 NOTE — DISCHARGE INSTRUCTIONS
will contact you in the morning between 8 AM and 10 AM to secure follow-up    Utilize erythromycin eye ointment once a day in your left eye

## 2023-06-05 ENCOUNTER — HOSPITAL ENCOUNTER (EMERGENCY)
Age: 43
Discharge: LEFT BEFORE DISCHARGE | End: 2023-06-05
Attending: EMERGENCY MEDICINE
Payer: COMMERCIAL

## 2023-06-05 ENCOUNTER — APPOINTMENT (OUTPATIENT)
Dept: ULTRASOUND IMAGING | Age: 43
DRG: 383 | End: 2023-06-05
Payer: COMMERCIAL

## 2023-06-05 ENCOUNTER — HOSPITAL ENCOUNTER (INPATIENT)
Age: 43
LOS: 3 days | Discharge: HOME | DRG: 383 | End: 2023-06-08
Attending: EMERGENCY MEDICINE | Admitting: STUDENT IN AN ORGANIZED HEALTH CARE EDUCATION/TRAINING PROGRAM
Payer: COMMERCIAL

## 2023-06-05 VITALS
HEART RATE: 99 BPM | BODY MASS INDEX: 29.05 KG/M2 | OXYGEN SATURATION: 99 % | WEIGHT: 180 LBS | RESPIRATION RATE: 16 BRPM | SYSTOLIC BLOOD PRESSURE: 116 MMHG | TEMPERATURE: 98.2 F | DIASTOLIC BLOOD PRESSURE: 61 MMHG

## 2023-06-05 DIAGNOSIS — M79.604 RIGHT LEG PAIN: Primary | ICD-10-CM

## 2023-06-05 DIAGNOSIS — L03.115 CELLULITIS OF RIGHT LOWER EXTREMITY: Primary | ICD-10-CM

## 2023-06-05 PROBLEM — S09.90XA CLOSED HEAD INJURY: Status: ACTIVE | Noted: 2023-06-05

## 2023-06-05 PROBLEM — M79.606 PAIN OF LOWER EXTREMITY: Status: ACTIVE | Noted: 2023-06-05

## 2023-06-05 PROBLEM — L03.90 CELLULITIS: Status: ACTIVE | Noted: 2023-06-05

## 2023-06-05 PROBLEM — Z72.0 TOBACCO ABUSE: Status: ACTIVE | Noted: 2023-06-05

## 2023-06-05 PROBLEM — F11.11 HEROIN USE DISORDER, MILD, IN EARLY REMISSION (HCC): Status: ACTIVE | Noted: 2023-06-05

## 2023-06-05 PROBLEM — F19.90 IV DRUG USER: Status: ACTIVE | Noted: 2023-06-05

## 2023-06-05 PROBLEM — B19.20 VIRAL HEPATITIS C: Status: ACTIVE | Noted: 2021-04-23

## 2023-06-05 PROBLEM — T15.02XA FOREIGN BODY OF LEFT CORNEA: Status: ACTIVE | Noted: 2023-06-05

## 2023-06-05 PROBLEM — Z86.19 HISTORY OF HEPATITIS C: Status: ACTIVE | Noted: 2023-06-05

## 2023-06-05 LAB
ALBUMIN SERPL-MCNC: 3.8 G/DL (ref 3.5–5.2)
ALP SERPL-CCNC: 129 U/L (ref 40–129)
ALT SERPL-CCNC: 52 U/L (ref 0–40)
ANION GAP SERPL CALCULATED.3IONS-SCNC: 8 MMOL/L (ref 7–16)
AST SERPL-CCNC: 43 U/L (ref 0–39)
BILIRUB SERPL-MCNC: 0.3 MG/DL (ref 0–1.2)
BUN SERPL-MCNC: 10 MG/DL (ref 6–20)
CALCIUM SERPL-MCNC: 8.7 MG/DL (ref 8.6–10.2)
CHLORIDE SERPL-SCNC: 98 MMOL/L (ref 98–107)
CO2 SERPL-SCNC: 30 MMOL/L (ref 22–29)
CREAT SERPL-MCNC: 1 MG/DL (ref 0.7–1.2)
ERYTHROCYTE [DISTWIDTH] IN BLOOD BY AUTOMATED COUNT: 12.8 FL (ref 11.5–15)
GLUCOSE SERPL-MCNC: 87 MG/DL (ref 74–99)
HCT VFR BLD AUTO: 39.5 % (ref 37–54)
HGB BLD-MCNC: 13 G/DL (ref 12.5–16.5)
MCH RBC QN AUTO: 27.1 PG (ref 26–35)
MCHC RBC AUTO-ENTMCNC: 32.9 % (ref 32–34.5)
MCV RBC AUTO: 82.5 FL (ref 80–99.9)
PLATELET # BLD AUTO: 308 E9/L (ref 130–450)
PMV BLD AUTO: 9.9 FL (ref 7–12)
POTASSIUM SERPL-SCNC: 3.6 MMOL/L (ref 3.5–5)
PROT SERPL-MCNC: 7.4 G/DL (ref 6.4–8.3)
RBC # BLD AUTO: 4.79 E12/L (ref 3.8–5.8)
SODIUM SERPL-SCNC: 136 MMOL/L (ref 132–146)
WBC # BLD: 8.1 E9/L (ref 4.5–11.5)

## 2023-06-05 PROCEDURE — 99282 EMERGENCY DEPT VISIT SF MDM: CPT

## 2023-06-05 PROCEDURE — 80053 COMPREHEN METABOLIC PANEL: CPT

## 2023-06-05 PROCEDURE — 99285 EMERGENCY DEPT VISIT HI MDM: CPT

## 2023-06-05 PROCEDURE — 36415 COLL VENOUS BLD VENIPUNCTURE: CPT

## 2023-06-05 PROCEDURE — 6360000002 HC RX W HCPCS: Performed by: EMERGENCY MEDICINE

## 2023-06-05 PROCEDURE — 2580000003 HC RX 258: Performed by: EMERGENCY MEDICINE

## 2023-06-05 PROCEDURE — 93971 EXTREMITY STUDY: CPT

## 2023-06-05 PROCEDURE — 99222 1ST HOSP IP/OBS MODERATE 55: CPT | Performed by: STUDENT IN AN ORGANIZED HEALTH CARE EDUCATION/TRAINING PROGRAM

## 2023-06-05 PROCEDURE — APPSS60 APP SPLIT SHARED TIME 46-60 MINUTES: Performed by: NURSE PRACTITIONER

## 2023-06-05 PROCEDURE — 85027 COMPLETE CBC AUTOMATED: CPT

## 2023-06-05 PROCEDURE — 1200000000 HC SEMI PRIVATE

## 2023-06-05 RX ORDER — ENOXAPARIN SODIUM 100 MG/ML
40 INJECTION SUBCUTANEOUS DAILY
Status: DISCONTINUED | OUTPATIENT
Start: 2023-06-05 | End: 2023-06-08 | Stop reason: HOSPADM

## 2023-06-05 RX ORDER — ACETAMINOPHEN 650 MG/1
650 SUPPOSITORY RECTAL EVERY 6 HOURS PRN
Status: DISCONTINUED | OUTPATIENT
Start: 2023-06-05 | End: 2023-06-08 | Stop reason: HOSPADM

## 2023-06-05 RX ORDER — NICOTINE 21 MG/24HR
1 PATCH, TRANSDERMAL 24 HOURS TRANSDERMAL DAILY
Status: DISCONTINUED | OUTPATIENT
Start: 2023-06-05 | End: 2023-06-08 | Stop reason: HOSPADM

## 2023-06-05 RX ORDER — SODIUM CHLORIDE 0.9 % (FLUSH) 0.9 %
5-40 SYRINGE (ML) INJECTION PRN
Status: DISCONTINUED | OUTPATIENT
Start: 2023-06-05 | End: 2023-06-08 | Stop reason: HOSPADM

## 2023-06-05 RX ORDER — SODIUM CHLORIDE 0.9 % (FLUSH) 0.9 %
5-40 SYRINGE (ML) INJECTION EVERY 12 HOURS SCHEDULED
Status: DISCONTINUED | OUTPATIENT
Start: 2023-06-05 | End: 2023-06-05 | Stop reason: HOSPADM

## 2023-06-05 RX ORDER — SODIUM CHLORIDE 9 MG/ML
INJECTION, SOLUTION INTRAVENOUS PRN
Status: DISCONTINUED | OUTPATIENT
Start: 2023-06-05 | End: 2023-06-08 | Stop reason: HOSPADM

## 2023-06-05 RX ORDER — SODIUM CHLORIDE 0.9 % (FLUSH) 0.9 %
5-40 SYRINGE (ML) INJECTION PRN
Status: DISCONTINUED | OUTPATIENT
Start: 2023-06-05 | End: 2023-06-05 | Stop reason: HOSPADM

## 2023-06-05 RX ORDER — SODIUM CHLORIDE 0.9 % (FLUSH) 0.9 %
5-40 SYRINGE (ML) INJECTION EVERY 12 HOURS SCHEDULED
Status: DISCONTINUED | OUTPATIENT
Start: 2023-06-05 | End: 2023-06-08 | Stop reason: HOSPADM

## 2023-06-05 RX ORDER — ACETAMINOPHEN 325 MG/1
650 TABLET ORAL EVERY 6 HOURS PRN
Status: DISCONTINUED | OUTPATIENT
Start: 2023-06-05 | End: 2023-06-08 | Stop reason: HOSPADM

## 2023-06-05 RX ORDER — POLYETHYLENE GLYCOL 3350 17 G/17G
17 POWDER, FOR SOLUTION ORAL DAILY PRN
Status: DISCONTINUED | OUTPATIENT
Start: 2023-06-05 | End: 2023-06-08 | Stop reason: HOSPADM

## 2023-06-05 RX ORDER — SODIUM CHLORIDE 9 MG/ML
INJECTION, SOLUTION INTRAVENOUS PRN
Status: DISCONTINUED | OUTPATIENT
Start: 2023-06-05 | End: 2023-06-05 | Stop reason: HOSPADM

## 2023-06-05 RX ORDER — ONDANSETRON 2 MG/ML
4 INJECTION INTRAMUSCULAR; INTRAVENOUS EVERY 6 HOURS PRN
Status: DISCONTINUED | OUTPATIENT
Start: 2023-06-05 | End: 2023-06-08 | Stop reason: HOSPADM

## 2023-06-05 RX ORDER — ONDANSETRON 4 MG/1
4 TABLET, ORALLY DISINTEGRATING ORAL EVERY 8 HOURS PRN
Status: DISCONTINUED | OUTPATIENT
Start: 2023-06-05 | End: 2023-06-08 | Stop reason: HOSPADM

## 2023-06-05 RX ADMIN — VANCOMYCIN HYDROCHLORIDE 1250 MG: 10 INJECTION, POWDER, LYOPHILIZED, FOR SOLUTION INTRAVENOUS at 20:33

## 2023-06-05 ASSESSMENT — PAIN - FUNCTIONAL ASSESSMENT
PAIN_FUNCTIONAL_ASSESSMENT: 0-10
PAIN_FUNCTIONAL_ASSESSMENT: 0-10

## 2023-06-05 ASSESSMENT — PAIN SCALES - GENERAL
PAINLEVEL_OUTOF10: 5
PAINLEVEL_OUTOF10: 5

## 2023-06-05 ASSESSMENT — PAIN DESCRIPTION - LOCATION
LOCATION: LEG
LOCATION: LEG

## 2023-06-05 ASSESSMENT — PAIN DESCRIPTION - ORIENTATION: ORIENTATION: RIGHT

## 2023-06-05 NOTE — ED PROVIDER NOTES
[address] ENCOUNTER      Pt Name: Jerry Alexander  MRN: [id number]  [place] 1980  Date of evaluation: 6/5/2023  Provider: Geraldine Greenberg DO  PCP: Louetta Cushing, APRN - NP  Note Started: 11:59 AM EDT 6/5/23    CHIEF COMPLAINT       Chief Complaint   Patient presents with    Leg Pain     Right leg pain with edema/redness on Saturday. No injury known, Is IV use user       HISTORY OF PRESENT ILLNESS: 1 or more Elements   History From: Patient  Limitations to history : None    Jerry Alexander is a 40 y.o. male who presents to the ED due to right leg pain. Patient states that he is an IV drug user and recently used fentanyl in his right lower extremity. States he has been shooting up in the right leg on several occasions over the past 2 weeks. States that over the past several days he has had progressively worsening right leg swelling, edema and erythema. He has begun to notice induration to the right calf. He is not any blood thinners and denies any history of blood clots. Endorses subjective fever and chills. Denies any nausea or vomiting. Denies any chest pain or shortness of breath. Nursing Notes were all reviewed and agreed with or any disagreements were addressed in the HPI. REVIEW OF SYSTEMS :    Positives and Pertinent negatives as per HPI. SURGICAL HISTORY     Past Surgical History:   Procedure Laterality Date    HIP FRACTURE SURGERY Right        CURRENTMEDICATIONS       Previous Medications    METHADONE 10 MG/5ML SOLUTION    42.5 mLs. ALLERGIES     Ceclor [cefaclor]    FAMILYHISTORY     History reviewed. No pertinent family history.      SOCIAL HISTORY       Social History     Tobacco Use    Smoking status: Every Day     Packs/day: 1.00     Years: 25.00     Pack years: 25.00     Types: Cigarettes     Start date: 1994    Smokeless tobacco: Never   Vaping Use    Vaping Use: Never used   Substance Use Topics    Alcohol use:

## 2023-06-05 NOTE — ED NOTES
Pt undressed and placed into a gown. Pt reports he injected \"fentanyl\" to Right lower leg past Saturday am and then Saturday evening started with lower leg pain and redness. Progressively worsening Saturday evening and thru out Sunday. Here today with Right lower leg pain redness and swelling and hot to touch from the foot up to mid posterior thigh. Patient reporting he is a very difficult stick for blood and IV access that a U/S is used. U/S @ bedside.  Exam by Dr Estrellita Ambrocio and Dr Amirah Grijalva, RN  06/05/23 2535

## 2023-06-05 NOTE — ED NOTES
Patient wanting to leave AMA due to being admitted to another faciity and that he has noway to secure his tools and other  belongings in his truck in the ER parking lot and no one is able to pick it up. .  Both Dr Nahun Dick and Dr Hernandez Show away , pt informed myself that he will have his wife take him to another hospital for admission.      Jovanni Fagan RN  06/05/23 1831

## 2023-06-05 NOTE — ED TRIAGE NOTES
Department of Emergency Medicine    FIRST PROVIDER TRIAGE NOTE             Independent MLP           6/5/23  3:35 PM EDT    Date of Encounter: 6/5/23   MRN: [id number]    Vitals:    06/05/23 1534   BP: 120/83   Pulse: 97   Resp: 16   Temp: 99 °F (37.2 °C)   TempSrc: Oral   SpO2: 100%   Weight: 180 lb (81.6 kg)   Height: 5' 6\" (1.676 m)      HPI: Nile Villarreal is a 40 y.o. male who presents to the ED for Leg Swelling (Since Saturday. Redness. Recently signed out from [facility] because he didn't want EMS trip for admission. States they were going to start IV abx.)     Pt left before US was done or before any labs were drawn     ROS: Negative for cp or sob. Physical Exam:   Gen Appearance/Constitutional: alert  CV: regular rate     Initial Plan of Care: All treatment areas with department are currently occupied. Plan to order/Initiate the following while awaiting opening in ED: ultrasound.     Initial Plan of Care: Initiate Treatment-Testing, Proceed toTreatment Area When Bed Available for ED Attending/MLP to Continue Care    Electronically signed by Carlo Jain PA-C   DD: 6/5/23

## 2023-06-05 NOTE — ED PROVIDER NOTES
laboratory and radiology results have been personally reviewed by myself   LABS:  Results for orders placed or performed during the hospital encounter of 06/05/23   CBC   Result Value Ref Range    WBC 8.1 4.5 - 11.5 E9/L    RBC 4.79 3.80 - 5.80 E12/L    Hemoglobin 13.0 12.5 - 16.5 g/dL    Hematocrit 39.5 37.0 - 54.0 %    MCV 82.5 80.0 - 99.9 fL    MCH 27.1 26.0 - 35.0 pg    MCHC 32.9 32.0 - 34.5 %    RDW 12.8 11.5 - 15.0 fL    Platelets 220 835 - 038 E9/L    MPV 9.9 7.0 - 12.0 fL       RADIOLOGY:  Interpreted by Radiologist.  US DUP LOWER EXTREMITY RIGHT ART   Final Result   No evidence of DVT in the right lower extremity. Right inguinal lymph node.             ------------------------- NURSING NOTES AND VITALS REVIEWED ---------------------------   The nursing notes within the ED encounter and vital signs as below have been reviewed. /83   Pulse 97   Temp 99 °F (37.2 °C) (Oral)   Resp 16   Ht 5' 6\" (1.676 m)   Wt 180 lb (81.6 kg)   SpO2 100%   BMI 29.05 kg/m²   Oxygen Saturation Interpretation: Normal      ---------------------------------------------------PHYSICAL EXAM--------------------------------------      Constitutional/General: Alert and oriented x3, well appearing, non toxic in NAD  Head: NC/AT  Eyes: PERRL, EOMI  Nose:  Nares patent. No congestion or discharge noted. Ears:  TM's intact without erythema or perforation. External canal without swelling  Mouth: Oropharynx clear, handling secretions, no trismus  Neck: Supple, full ROM, no meningeal signs  Pulmonary: Lungs Clear to auscultation bilaterally. No rales or rhonchi or wheezes noted. No retractions. Cardiovascular:  Regular rate and rhythm, no murmurs, gallops, or rubs. 2+ symmetric distal pulses   Chest:  No tenderness, deformity or crepitus  Abdomen: Soft, non tender, non distended, normal bowel sounds  Back:  No tenderness to palpation on the cervical or thoracic or lumbar spine  Extremities: Moves all extremities x 4.  Warm

## 2023-06-05 NOTE — ED NOTES
Dr Donna Vences and Dr Lam Du aware of pt leaving AMA. Pt signed AMA and discharge instructions given to pt. Pt was ambulatory on discharge.      Marques Grace RN  06/05/23 1639

## 2023-06-06 LAB
ALBUMIN SERPL-MCNC: 3.5 G/DL (ref 3.5–5.2)
ALP SERPL-CCNC: 112 U/L (ref 40–129)
ALT SERPL-CCNC: 54 U/L (ref 0–40)
ANION GAP SERPL CALCULATED.3IONS-SCNC: 8 MMOL/L (ref 7–16)
ANTISTREPTOLYSIN-O: 82 IU/ML (ref 0–200)
AST SERPL-CCNC: 40 U/L (ref 0–39)
BASOPHILS # BLD: 0.06 E9/L (ref 0–0.2)
BASOPHILS NFR BLD: 1 % (ref 0–2)
BILIRUB DIRECT SERPL-MCNC: <0.2 MG/DL (ref 0–0.3)
BILIRUB INDIRECT SERPL-MCNC: NORMAL MG/DL (ref 0–1)
BILIRUB SERPL-MCNC: 0.2 MG/DL (ref 0–1.2)
BUN SERPL-MCNC: 10 MG/DL (ref 6–20)
CALCIUM SERPL-MCNC: 8.8 MG/DL (ref 8.6–10.2)
CHLORIDE SERPL-SCNC: 104 MMOL/L (ref 98–107)
CO2 SERPL-SCNC: 31 MMOL/L (ref 22–29)
CREAT SERPL-MCNC: 0.9 MG/DL (ref 0.7–1.2)
CRP SERPL HS-MCNC: 8.1 MG/DL (ref 0–0.4)
EOSINOPHIL # BLD: 0.21 E9/L (ref 0.05–0.5)
EOSINOPHIL NFR BLD: 3.5 % (ref 0–6)
ERYTHROCYTE [DISTWIDTH] IN BLOOD BY AUTOMATED COUNT: 13 FL (ref 11.5–15)
ERYTHROCYTE [SEDIMENTATION RATE] IN BLOOD BY WESTERGREN METHOD: 39 MM/HR (ref 0–15)
GLUCOSE SERPL-MCNC: 88 MG/DL (ref 74–99)
HCT VFR BLD AUTO: 40.2 % (ref 37–54)
HGB BLD-MCNC: 13.3 G/DL (ref 12.5–16.5)
IMM GRANULOCYTES # BLD: 0.09 E9/L
IMM GRANULOCYTES NFR BLD: 1.5 % (ref 0–5)
LYMPHOCYTES # BLD: 2.05 E9/L (ref 1.5–4)
LYMPHOCYTES NFR BLD: 34.2 % (ref 20–42)
MCH RBC QN AUTO: 27.4 PG (ref 26–35)
MCHC RBC AUTO-ENTMCNC: 33.1 % (ref 32–34.5)
MCV RBC AUTO: 82.9 FL (ref 80–99.9)
MONOCYTES # BLD: 0.87 E9/L (ref 0.1–0.95)
MONOCYTES NFR BLD: 14.5 % (ref 2–12)
NEUTROPHILS # BLD: 2.71 E9/L (ref 1.8–7.3)
NEUTS SEG NFR BLD: 45.3 % (ref 43–80)
PLATELET # BLD AUTO: 319 E9/L (ref 130–450)
PMV BLD AUTO: 9.6 FL (ref 7–12)
POTASSIUM SERPL-SCNC: 4 MMOL/L (ref 3.5–5)
PROT SERPL-MCNC: 7 G/DL (ref 6.4–8.3)
RBC # BLD AUTO: 4.85 E12/L (ref 3.8–5.8)
SODIUM SERPL-SCNC: 143 MMOL/L (ref 132–146)
WBC # BLD: 6 E9/L (ref 4.5–11.5)

## 2023-06-06 PROCEDURE — 99232 SBSQ HOSP IP/OBS MODERATE 35: CPT | Performed by: INTERNAL MEDICINE

## 2023-06-06 PROCEDURE — 85025 COMPLETE CBC W/AUTO DIFF WBC: CPT

## 2023-06-06 PROCEDURE — 36415 COLL VENOUS BLD VENIPUNCTURE: CPT

## 2023-06-06 PROCEDURE — 2580000003 HC RX 258: Performed by: NURSE PRACTITIONER

## 2023-06-06 PROCEDURE — 86140 C-REACTIVE PROTEIN: CPT

## 2023-06-06 PROCEDURE — 6370000000 HC RX 637 (ALT 250 FOR IP): Performed by: INTERNAL MEDICINE

## 2023-06-06 PROCEDURE — 80076 HEPATIC FUNCTION PANEL: CPT

## 2023-06-06 PROCEDURE — 6360000002 HC RX W HCPCS: Performed by: NURSE PRACTITIONER

## 2023-06-06 PROCEDURE — 86060 ANTISTREPTOLYSIN O TITER: CPT

## 2023-06-06 PROCEDURE — 85651 RBC SED RATE NONAUTOMATED: CPT

## 2023-06-06 PROCEDURE — 1200000000 HC SEMI PRIVATE

## 2023-06-06 PROCEDURE — 6370000000 HC RX 637 (ALT 250 FOR IP): Performed by: NURSE PRACTITIONER

## 2023-06-06 PROCEDURE — 80053 COMPREHEN METABOLIC PANEL: CPT

## 2023-06-06 PROCEDURE — 87522 HEPATITIS C REVRS TRNSCRPJ: CPT

## 2023-06-06 RX ORDER — METHADONE HYDROCHLORIDE 10 MG/ML
40 CONCENTRATE ORAL DAILY
Status: DISCONTINUED | OUTPATIENT
Start: 2023-06-06 | End: 2023-06-08 | Stop reason: HOSPADM

## 2023-06-06 RX ADMIN — Medication 10 ML: at 01:28

## 2023-06-06 RX ADMIN — PIPERACILLIN AND TAZOBACTAM 3375 MG: 3; .375 INJECTION, POWDER, LYOPHILIZED, FOR SOLUTION INTRAVENOUS at 12:44

## 2023-06-06 RX ADMIN — Medication 5 ML: at 21:32

## 2023-06-06 RX ADMIN — METHADONE HYDROCHLORIDE 40 MG: 10 CONCENTRATE ORAL at 12:44

## 2023-06-06 RX ADMIN — Medication 10 ML: at 08:40

## 2023-06-06 RX ADMIN — ACETAMINOPHEN 650 MG: 325 TABLET ORAL at 01:24

## 2023-06-06 RX ADMIN — VANCOMYCIN HYDROCHLORIDE 1250 MG: 10 INJECTION, POWDER, LYOPHILIZED, FOR SOLUTION INTRAVENOUS at 08:39

## 2023-06-06 RX ADMIN — PIPERACILLIN AND TAZOBACTAM 3375 MG: 3; .375 INJECTION, POWDER, LYOPHILIZED, FOR SOLUTION INTRAVENOUS at 03:57

## 2023-06-06 RX ADMIN — VANCOMYCIN HYDROCHLORIDE 1250 MG: 10 INJECTION, POWDER, LYOPHILIZED, FOR SOLUTION INTRAVENOUS at 21:26

## 2023-06-06 ASSESSMENT — PAIN DESCRIPTION - DESCRIPTORS: DESCRIPTORS: ACHING;SORE

## 2023-06-06 ASSESSMENT — PAIN SCALES - WONG BAKER
WONGBAKER_NUMERICALRESPONSE: 0
WONGBAKER_NUMERICALRESPONSE: 0

## 2023-06-06 ASSESSMENT — PAIN SCALES - GENERAL
PAINLEVEL_OUTOF10: 5
PAINLEVEL_OUTOF10: 0
PAINLEVEL_OUTOF10: 0

## 2023-06-06 ASSESSMENT — PAIN - FUNCTIONAL ASSESSMENT: PAIN_FUNCTIONAL_ASSESSMENT: ACTIVITIES ARE NOT PREVENTED

## 2023-06-06 ASSESSMENT — PAIN DESCRIPTION - LOCATION: LOCATION: HEAD

## 2023-06-06 NOTE — ED NOTES
ED to Inpatient Handoff Report    Notified 5s that electronic handoff available and patient ready for transport to room . Safety Risks: None identified    Patient in Restraints: no    Constant Observer or Patient : no    Telemetry Monitoring Ordered: NO          Order to transfer to unit without monitor: YES    Last MEWS: 1 Time completed: 0042    Vitals:    06/05/23 1534 06/06/23 0042   BP: 120/83 122/79   Pulse: 97 72   Resp: 16 16   Temp: 99 °F (37.2 °C) 98.4 °F (36.9 °C)   TempSrc: Oral Oral   SpO2: 100% 99%   Weight: 180 lb (81.6 kg)    Height: 5' 6\" (1.676 m)        Opportunity for questions and clarification was provided.          Kalpesh Carroll [facility]  06/06/23 4935

## 2023-06-06 NOTE — PLAN OF CARE
Problem: Discharge Planning  Goal: Discharge to home or other facility with appropriate resources  Outcome: Progressing  Flowsheets (Taken 6/6/2023 0146)  Discharge to home or other facility with appropriate resources: Identify barriers to discharge with patient and caregiver

## 2023-06-06 NOTE — CARE COORDINATION
6/6/2023 Social Work Discharge Planning:Cellulitis of rt lower ext. ID is following. Heroin drug user. This worker met with Pt to discuss  role and transition of care/discharge planning. Pt is independent from home with his spouse and no DME. Room air. IV ATB. Pt reports he goes to [facility] for regular meth regimine-awaiting a call from them.  Electronically signed by LEVI Diaz on 6/6/2023 at 9:30 AM

## 2023-06-06 NOTE — H&P
180 lb (81.6 kg)   SpO2 100%   BMI 29.05 kg/m²   Gen: awake, alert, NAD  Lungs: clear to auscultation bilaterally no crackles no wheezing. Heart: RRR, no murmur   Abdomen: soft nontender nondistended positive bowel sounds. Extremities: full range of motion, patchy redness and erythema of the right lower extremity. Impression:  Principal Problem:    Cellulitis  Active Problems:    History of hepatitis C    IV drug user    Tobacco abuse  Resolved Problems:    * No resolved hospital problems. *      My findings/plan include:  Patient presents with cellulitis of the right lower extremity. Continue IV vancomycin and IV Zosyn at this time. Consult ID for further input. Patient also with history of hepatitis C, requesting lab work to initiate therapy. Appreciate ID input. Patient reports that he is on methadone, however [facility] report was unrevealing. We will ask nursing staff to reach out to the clinic in the morning to confirm. NOTE: This report was transcribed using voice recognition software. Every effort was made to ensure accuracy; however, inadvertent computerized transcription errors may be present.   Electronically signed by Tray Phelan MD on 6/6/2023 at 12:42 AM

## 2023-06-06 NOTE — CONSULTS
Opiates , IV     Comment: (-) x6 months heroin for about 20 yrs     Family History:   No family history on file. . Otherwise non-pertinent to the chief complaint. REVIEW OF SYSTEMS:    As mentioned in HPI, all other systems negative. PHYSICAL EXAM:    Vitals:    BP (!) 100/59   Pulse 71   Temp 97.6 °F (36.4 °C) (Oral)   Resp 16   Ht 5' 6\" (1.676 m)   Wt 180 lb (81.6 kg)   SpO2 93%   BMI 29.05 kg/m²   Constitutional: The patient is awake, alert, and oriented. Skin: Warm and dry. Multiple skin wounds: right hand small wound. Right Leg redness, induration. No jaundice. HEENT: Eyes show round, and reactive pupils. Moist mucous membranes, no ulcerations, no thrush. Neck: Supple to movements. No lymphadenopathy. Chest: No use of accessory muscles to breathe. Symmetrical expansion. Auscultation reveals no wheezing, crackles, or rhonchi. Cardiovascular: S1 and S2 are rhythmic and regular. No murmurs appreciated. Abdomen: soft. Non tender  Extremities: No clubbing, no cyanosis, no edema. Musculoskeletal: right leg cellulitis.    Neurological: alert, oriented x 3  Lines: peripheral      CBC+dif:  Recent Labs     06/05/23 1945   WBC 8.1   HGB 13.0   HCT 39.5   MCV 82.5        No results found for: CRP  No results found for: CRPHS  No results found for: SEDRATE  Lab Results   Component Value Date    ALT 52 (H) 06/05/2023    AST 43 (H) 06/05/2023    ALKPHOS 129 06/05/2023    BILITOT 0.3 06/05/2023     Lab Results   Component Value Date/Time     06/05/2023 07:45 PM    K 3.6 06/05/2023 07:45 PM    CL 98 06/05/2023 07:45 PM    CO2 30 06/05/2023 07:45 PM    BUN 10 06/05/2023 07:45 PM    CREATININE 1.0 06/05/2023 07:45 PM    GFRAA >60 04/08/2020 09:45 PM    LABGLOM >60 06/05/2023 07:45 PM    GLUCOSE 87 06/05/2023 07:45 PM    PROT 7.4 06/05/2023 07:45 PM    LABALBU 3.8 06/05/2023 07:45 PM    CALCIUM 8.7 06/05/2023 07:45 PM    BILITOT 0.3 06/05/2023 07:45 PM    ALKPHOS 129 06/05/2023 07:45 PM

## 2023-06-07 PROCEDURE — A4216 STERILE WATER/SALINE, 10 ML: HCPCS | Performed by: STUDENT IN AN ORGANIZED HEALTH CARE EDUCATION/TRAINING PROGRAM

## 2023-06-07 PROCEDURE — 6360000002 HC RX W HCPCS: Performed by: STUDENT IN AN ORGANIZED HEALTH CARE EDUCATION/TRAINING PROGRAM

## 2023-06-07 PROCEDURE — 6370000000 HC RX 637 (ALT 250 FOR IP): Performed by: INTERNAL MEDICINE

## 2023-06-07 PROCEDURE — 1200000000 HC SEMI PRIVATE

## 2023-06-07 PROCEDURE — 2580000003 HC RX 258: Performed by: NURSE PRACTITIONER

## 2023-06-07 PROCEDURE — 99233 SBSQ HOSP IP/OBS HIGH 50: CPT | Performed by: INTERNAL MEDICINE

## 2023-06-07 PROCEDURE — 6370000000 HC RX 637 (ALT 250 FOR IP): Performed by: NURSE PRACTITIONER

## 2023-06-07 PROCEDURE — 2580000003 HC RX 258: Performed by: STUDENT IN AN ORGANIZED HEALTH CARE EDUCATION/TRAINING PROGRAM

## 2023-06-07 RX ADMIN — Medication 10 ML: at 09:31

## 2023-06-07 RX ADMIN — Medication 10 ML: at 22:18

## 2023-06-07 RX ADMIN — METHADONE HYDROCHLORIDE 40 MG: 10 CONCENTRATE ORAL at 09:30

## 2023-06-07 RX ADMIN — DAPTOMYCIN 500 MG: 500 INJECTION, POWDER, LYOPHILIZED, FOR SOLUTION INTRAVENOUS at 17:35

## 2023-06-07 NOTE — CARE COORDINATION
6/7/2023  Social Work Discharge Planning:Cellulitis of rt lower ext. ID is following. Heroin drug user. Pt is independent from home with his spouse and no DME. Room air. IV ATB. Pt reports he goes to [facility] for regular meth regimine-awaiting a call from them.  Electronically signed by LEVI Cornelius on 6/7/2023 at 9:30 AM

## 2023-06-08 VITALS
RESPIRATION RATE: 18 BRPM | WEIGHT: 180.5 LBS | DIASTOLIC BLOOD PRESSURE: 56 MMHG | SYSTOLIC BLOOD PRESSURE: 114 MMHG | OXYGEN SATURATION: 95 % | HEART RATE: 75 BPM | BODY MASS INDEX: 29.01 KG/M2 | TEMPERATURE: 97.8 F | HEIGHT: 66 IN

## 2023-06-08 PROBLEM — F19.10 IV DRUG ABUSE (HCC): Status: ACTIVE | Noted: 2023-06-08

## 2023-06-08 PROCEDURE — 6370000000 HC RX 637 (ALT 250 FOR IP): Performed by: NURSE PRACTITIONER

## 2023-06-08 PROCEDURE — 99239 HOSP IP/OBS DSCHRG MGMT >30: CPT | Performed by: INTERNAL MEDICINE

## 2023-06-08 PROCEDURE — A4216 STERILE WATER/SALINE, 10 ML: HCPCS | Performed by: STUDENT IN AN ORGANIZED HEALTH CARE EDUCATION/TRAINING PROGRAM

## 2023-06-08 PROCEDURE — 2580000003 HC RX 258: Performed by: STUDENT IN AN ORGANIZED HEALTH CARE EDUCATION/TRAINING PROGRAM

## 2023-06-08 PROCEDURE — 6370000000 HC RX 637 (ALT 250 FOR IP): Performed by: INTERNAL MEDICINE

## 2023-06-08 PROCEDURE — 2580000003 HC RX 258: Performed by: NURSE PRACTITIONER

## 2023-06-08 PROCEDURE — APPSS30 APP SPLIT SHARED TIME 16-30 MINUTES: Performed by: NURSE PRACTITIONER

## 2023-06-08 PROCEDURE — 6360000002 HC RX W HCPCS: Performed by: STUDENT IN AN ORGANIZED HEALTH CARE EDUCATION/TRAINING PROGRAM

## 2023-06-08 RX ORDER — SULFAMETHOXAZOLE AND TRIMETHOPRIM 800; 160 MG/1; MG/1
1 TABLET ORAL 2 TIMES DAILY
Qty: 14 TABLET | Refills: 0 | Status: SHIPPED | OUTPATIENT
Start: 2023-06-08 | End: 2023-06-15

## 2023-06-08 RX ADMIN — METHADONE HYDROCHLORIDE 40 MG: 10 CONCENTRATE ORAL at 09:42

## 2023-06-08 RX ADMIN — DAPTOMYCIN 500 MG: 500 INJECTION, POWDER, LYOPHILIZED, FOR SOLUTION INTRAVENOUS at 15:48

## 2023-06-08 RX ADMIN — Medication 10 ML: at 09:00

## 2023-06-08 NOTE — CARE COORDINATION
ID following; plan 1-2 days IV dapto. Pt wants PICC for labs. Prob will d/c on orals. PICC after d/c not an option d/t active community IV drug use. On methadone . Plan is home, no needs. Vika Hankins.

## 2023-06-08 NOTE — PROGRESS NOTES
-Consulted to dose vancomycin for skin and soft tissue infection.  -Patient received vancomycin 1250 mg x 1 on 6/5 with standing order of vancomycin 1250 mg IV q12h currently ordered (last dose 6/6 [0839]). -Projected AUC/KADI on this regimen is 518.  -Will continue vancomycin 1250 mg IV q12h. -Fourth overall dose will be 6/7 (0800). -Will place steady state vancomycin trough prior to 4th dose, so is ordered with morning labs on 6/7.  -DO NOT GIVE VANCOMYCIN IF VANCOMYCIN LEVEL IS GREATER THAN 20 MCG/ML.  -Will assess vancomycin level to determine future vancomycin dosing plans.
-Patient refusing lab draws and vancomycin.  -Category X interaction with methadone and linezolid. -Recommended to consider daptomycin.
[facility] Progress Note    Admitting Date and Time: 6/5/2023  5:54 PM  Admit Dx: Cellulitis [L03.90]  Cellulitis of right lower extremity [L03.115]      Assessment:    Principal Problem:    Cellulitis  Active Problems:    History of hepatitis C    IV drug user    Tobacco abuse  Resolved Problems:    * No resolved hospital problems. *      Plan:  1. RLE cellulitis  - non-purulent and without abscess  Suspected MRSA with hx of IV drug abuse and injections  Currently on IV Daptomycin as he has refused blood draws to monitor Vanco therapeutic levels    Per patient plan is home tomorrow AM on orals if continues to improve. ID is following    2. Hx of Hepatitis C  - status unknown  Hepatitis panel and viral load pending  Follow up outpatient    3. IVDA  - recent heroin use  [place] cessation    4. Tobacco abuse  - [place] cessation  Patch available      Subjective:  Patient is being followed for Cellulitis [L03.90]  Cellulitis of right lower extremity [L03.115]     Sitting in atrium with wife and daughter eating lunch, comfortable and states leg feeling much better. No fevers or chills    ROS: denies fever, chills, cp, sob, n/v, HA unless stated above.       daptomycin (CUBICIN) in NS IV syringe  6 mg/kg IntraVENous Q24H    methadone  40 mg Oral Daily    sodium chloride flush  5-40 mL IntraVENous 2 times per day    enoxaparin  40 mg SubCUTAneous Daily    nicotine  1 patch TransDERmal Daily     sodium chloride flush, 5-40 mL, PRN  sodium chloride, , PRN  ondansetron, 4 mg, Q8H PRN   Or  ondansetron, 4 mg, Q6H PRN  polyethylene glycol, 17 g, Daily PRN  acetaminophen, 650 mg, Q6H PRN   Or  acetaminophen, 650 mg, Q6H PRN         Objective:    BP (!) 114/56   Pulse 75   Temp 97.8 °F (36.6 °C) (Oral)   Resp 18   Ht 5' 6\" (1.676 m)   Wt 180 lb 8 oz (81.9 kg)   SpO2 95%   BMI 29.13 kg/m²     General Appearance: alert and oriented to person, place and time and in no acute distress  Skin: right lower
CLINICAL PHARMACY NOTE: MEDS TO BEDS    Total # of Prescriptions Filled: 1   The following medications were delivered to the patient:  Sulfamethoxazole 800-160 mg    Additional Documentation:
Call placed to [facility] where pt received methadone. Shellie Manuel LPN confirmed last does was distributed on 6/2 - 40 mg Methadone.
Consulted to draw labs. Patient refused to have them done even with ultrasound. He states even with ultrasound people have stuck him multiple times. He is requesting a PICC line for lab draws despite the fact that he has never had one. I explained that lab draws are not an indication for a PICC line due to risk of infection. Then he asked if he could be placed on another med that doesn't need a lab draw. I told him that the Osito Bucy may be the med that the infection would be most susceptible to and why they placed him on it. I told him that there are other labs ordered beside that lab so he would be no further ahead. He still refused to have the labs drawn. Charge RN notified of this conversation. 6/7/2023 9:57 AM JAD Padron-BC
[facility] Progress Note    Admitting Date and Time: 6/5/2023  5:54 PM  Admit Dx: Cellulitis [L03.90]  Cellulitis of right lower extremity [L03.115]    Subjective:  Patient is being followed for Cellulitis [L03.90]  Cellulitis of right lower extremity [L03.115]     Without significant place a PICC line. However I told him that with his heroin drug abuse history this would not be possible. ROS: denies fever, chills, cp, sob, n/v, HA unless stated above.       methadone  40 mg Oral Daily    sodium chloride flush  5-40 mL IntraVENous 2 times per day    enoxaparin  40 mg SubCUTAneous Daily    nicotine  1 patch TransDERmal Daily    vancomycin  1,250 mg IntraVENous Q12H     sodium chloride flush, 5-40 mL, PRN  sodium chloride, , PRN  ondansetron, 4 mg, Q8H PRN   Or  ondansetron, 4 mg, Q6H PRN  polyethylene glycol, 17 g, Daily PRN  acetaminophen, 650 mg, Q6H PRN   Or  acetaminophen, 650 mg, Q6H PRN         Objective:    BP (!) 119/55   Pulse 79   Temp 98.5 °F (36.9 °C) (Oral)   Resp 16   Ht 5' 6\" (1.676 m)   Wt 180 lb 8 oz (81.9 kg)   SpO2 97%   BMI 29.13 kg/m²     General Appearance: alert and oriented to person, place and time and in no acute distress  Skin: warm and dry  Head: normocephalic and atraumatic  Eyes: pupils equal, round, extraocular eye movements intact, conjunctivae normal  Pulmonary/Chest: clear to auscultation bilaterally- no wheezes, rales or rhonchi, normal air movement, no respiratory distress  Cardiovascular: normal rate, normal S1 and S2   Abdomen: soft, non-tender, non-distended, normal bowel sounds,   Extremities: no cyanosis, no clubbing and no edema  Neurologic: no cranial nerve deficit and speech normal          Recent Labs     06/05/23 1945 06/06/23  1535    143   K 3.6 4.0   CL 98 104   CO2 30* 31*   BUN 10 10   CREATININE 1.0 0.9   GLUCOSE 87 88   CALCIUM 8.7 8.8       Recent Labs     06/05/23 1945 06/06/23  1535   WBC 8.1 6.0   RBC 4.79 4.85   HGB 13.0
Infectious Disease  Progress Note  [facility]    Chief Complaint: cellulitis. Subjective:  he is feeling better. No fever. Pain is better. Redness is better==. Wants to go home. Family at bedside. Scheduled Meds:   daptomycin (CUBICIN) in NS IV syringe  6 mg/kg IntraVENous Q24H    methadone  40 mg Oral Daily    sodium chloride flush  5-40 mL IntraVENous 2 times per day    enoxaparin  40 mg SubCUTAneous Daily    nicotine  1 patch TransDERmal Daily     Continuous Infusions:   sodium chloride       PRN Meds:sodium chloride flush, sodium chloride, ondansetron **OR** ondansetron, polyethylene glycol, acetaminophen **OR** acetaminophen    Prior to Admission medications    Medication Sig Start Date End Date Taking? Authorizing Provider   sulfamethoxazole-trimethoprim (BACTRIM DS;SEPTRA DS) 800-160 MG per tablet Take 1 tablet by mouth 2 times daily for 7 days 6/8/23 6/15/23 Yes Gris Conway MD   methadone 10 MG/5ML solution Take 20 mLs by mouth daily. Historical Provider, MD        ROS:  As mentioned in subjective, all other systems negative      BP (!) 114/56   Pulse 75   Temp 97.8 °F (36.6 °C) (Oral)   Resp 18   Ht 5' 6\" (1.676 m)   Wt 180 lb 8 oz (81.9 kg)   SpO2 95%   BMI 29.13 kg/m²     Physical Exam  Constitutional: The patient is awake, alert, and oriented. Skin: Warm and dry. Multiple skin wounds: right hand small wound. Right Leg redness, induration. No jaundice. HEENT: Eyes show round, and reactive pupils. Moist mucous membranes, no ulcerations, no thrush. Neck: Supple to movements. No lymphadenopathy. Chest: No use of accessory muscles to breathe. Symmetrical expansion. Auscultation reveals no wheezing, crackles, or rhonchi. Cardiovascular: S1 and S2 are rhythmic and regular. No murmurs appreciated. Abdomen: soft. Non tender  Extremities: No clubbing, no cyanosis, no edema. Musculoskeletal: right leg cellulitis.  improving  Neurological: alert, oriented x 3  Lines: peripheral    Labs,
[facility] called and message left for verification of the methadone dose patient states is 80 mg daily in the am awaiting the verification
without vanc levels. Switched to IV Daptomycin. Educated him that we donot place long term IV lines unless required. Awaiting clinical improvement before d/c.  May be  ith in next 2 days,  Monitor labs  Will follow with       Electronically signed by Cornelio Arambula MD on 6/7/2023 at 3:58 PM
to site- reports taking two doses of ATB at home but doesn't know the name. Vanco and Zosyn started in ED. We will continue antibiotics. Consult ID for input. BC x 2 done in ED. CRP, ASO and sed rate pending. patient refusing to allow nursing or lab to draw, states only IV team. May need further imaging to rule out nec fas or abscess. US negative for DVT. History of hepatitis C-acute hepatitis panel ordered in ED which is negative. Viral load pending. Reports not being treated for it due to not being able to check his viral load. ID consulted for input. IV drug user-last used heroin on Saturday 6/3. UDS + Fentanyl and opiates. Reports taking methadone 80 mg daily but denies taking it 6/3-6/5. No report of methadone use in [place]. ? [facility] stated patient last received Methadone 6/2 at 40 mg not 80. Tobacco abuse-tobacco cessation discussed. Nicotine patch initiated. Transaminitis - AST/ALT elevated in setting of hepatitis. Code Status: Full code  DVT prophylaxis: Lovenox  Discharge dispo: home when stable     30 minutes time spent reviewing patient chart, assessing patient, discussing plan of care with patient and family, discussing plan of care with collaborating physician, and charting.      Electronically signed by LIEN Almeida NP on 6/6/2023 at 9:30 AM

## 2023-06-09 LAB
HCV RNA SERPL NAA+PROBE-ACNC: ABNORMAL IU/ML
HCV RNA SERPL NAA+PROBE-LOG IU: 6 LOG IU/ML
HCV RNA SERPL QL NAA+PROBE: DETECTED

## 2023-06-09 NOTE — DISCHARGE SUMMARY
tablet           Note that more than 30 minutes was spent in preparing discharge papers, discussing discharge with patient, medication review, etc.    Signed:  Electronically signed by LIEN Gallagher CNP on 6/9/2023 at 7:09 AM

## 2024-03-08 ENCOUNTER — APPOINTMENT (OUTPATIENT)
Dept: GENERAL RADIOLOGY | Age: 44
DRG: 383 | End: 2024-03-08
Payer: COMMERCIAL

## 2024-03-08 ENCOUNTER — HOSPITAL ENCOUNTER (INPATIENT)
Age: 44
LOS: 3 days | Discharge: HOME | DRG: 383 | End: 2024-03-11
Attending: EMERGENCY MEDICINE | Admitting: STUDENT IN AN ORGANIZED HEALTH CARE EDUCATION/TRAINING PROGRAM
Payer: COMMERCIAL

## 2024-03-08 ENCOUNTER — APPOINTMENT (OUTPATIENT)
Dept: CT IMAGING | Age: 44
DRG: 383 | End: 2024-03-08
Payer: COMMERCIAL

## 2024-03-08 ENCOUNTER — APPOINTMENT (OUTPATIENT)
Dept: ULTRASOUND IMAGING | Age: 44
DRG: 383 | End: 2024-03-08
Payer: COMMERCIAL

## 2024-03-08 DIAGNOSIS — L03.119 CELLULITIS OF LOWER EXTREMITY, UNSPECIFIED LATERALITY: Primary | ICD-10-CM

## 2024-03-08 PROBLEM — R07.9 CHEST PAIN: Status: ACTIVE | Noted: 2024-03-08

## 2024-03-08 LAB
ALBUMIN SERPL-MCNC: 3.9 G/DL (ref 3.5–5.2)
ALP SERPL-CCNC: 135 U/L (ref 40–129)
ALT SERPL-CCNC: 44 U/L (ref 0–40)
ANION GAP SERPL CALCULATED.3IONS-SCNC: 11 MMOL/L (ref 7–16)
AST SERPL-CCNC: 35 U/L (ref 0–39)
BASOPHILS # BLD: 0.04 K/UL (ref 0–0.2)
BASOPHILS NFR BLD: 1 % (ref 0–2)
BILIRUB SERPL-MCNC: 0.3 MG/DL (ref 0–1.2)
BUN SERPL-MCNC: 9 MG/DL (ref 6–20)
CALCIUM SERPL-MCNC: 9.2 MG/DL (ref 8.6–10.2)
CHLORIDE SERPL-SCNC: 98 MMOL/L (ref 98–107)
CO2 SERPL-SCNC: 26 MMOL/L (ref 22–29)
CREAT SERPL-MCNC: 0.9 MG/DL (ref 0.7–1.2)
EKG ATRIAL RATE: 101 BPM
EKG P AXIS: 55 DEGREES
EKG P-R INTERVAL: 136 MS
EKG Q-T INTERVAL: 368 MS
EKG QRS DURATION: 84 MS
EKG QTC CALCULATION (BAZETT): 477 MS
EKG R AXIS: 8 DEGREES
EKG T AXIS: 37 DEGREES
EKG VENTRICULAR RATE: 101 BPM
EOSINOPHIL # BLD: 0.06 K/UL (ref 0.05–0.5)
EOSINOPHILS RELATIVE PERCENT: 1 % (ref 0–6)
ERYTHROCYTE [DISTWIDTH] IN BLOOD BY AUTOMATED COUNT: 13.9 % (ref 11.5–15)
GFR SERPL CREATININE-BSD FRML MDRD: >60 ML/MIN/1.73M2
GLUCOSE SERPL-MCNC: 102 MG/DL (ref 74–99)
HCT VFR BLD AUTO: 37.9 % (ref 37–54)
HGB BLD-MCNC: 12.2 G/DL (ref 12.5–16.5)
IMM GRANULOCYTES # BLD AUTO: <0.03 K/UL (ref 0–0.58)
IMM GRANULOCYTES NFR BLD: 0 % (ref 0–5)
LACTATE BLDV-SCNC: 1.2 MMOL/L (ref 0.5–2.2)
LYMPHOCYTES NFR BLD: 2.11 K/UL (ref 1.5–4)
LYMPHOCYTES RELATIVE PERCENT: 32 % (ref 20–42)
MCH RBC QN AUTO: 25.7 PG (ref 26–35)
MCHC RBC AUTO-ENTMCNC: 32.2 G/DL (ref 32–34.5)
MCV RBC AUTO: 80 FL (ref 80–99.9)
MONOCYTES NFR BLD: 0.51 K/UL (ref 0.1–0.95)
MONOCYTES NFR BLD: 8 % (ref 2–12)
NEUTROPHILS NFR BLD: 59 % (ref 43–80)
NEUTS SEG NFR BLD: 3.9 K/UL (ref 1.8–7.3)
PLATELET # BLD AUTO: 360 K/UL (ref 130–450)
PMV BLD AUTO: 9.3 FL (ref 7–12)
POTASSIUM SERPL-SCNC: 4.2 MMOL/L (ref 3.5–5)
PROT SERPL-MCNC: 7.8 G/DL (ref 6.4–8.3)
RBC # BLD AUTO: 4.74 M/UL (ref 3.8–5.8)
SODIUM SERPL-SCNC: 135 MMOL/L (ref 132–146)
TROPONIN I SERPL HS-MCNC: 7 NG/L (ref 0–11)
WBC OTHER # BLD: 6.6 K/UL (ref 4.5–11.5)

## 2024-03-08 PROCEDURE — 71046 X-RAY EXAM CHEST 2 VIEWS: CPT

## 2024-03-08 PROCEDURE — 80053 COMPREHEN METABOLIC PANEL: CPT

## 2024-03-08 PROCEDURE — 71275 CT ANGIOGRAPHY CHEST: CPT

## 2024-03-08 PROCEDURE — 96365 THER/PROPH/DIAG IV INF INIT: CPT

## 2024-03-08 PROCEDURE — 6360000004 HC RX CONTRAST MEDICATION: Performed by: RADIOLOGY

## 2024-03-08 PROCEDURE — 2580000003 HC RX 258: Performed by: EMERGENCY MEDICINE

## 2024-03-08 PROCEDURE — 83605 ASSAY OF LACTIC ACID: CPT

## 2024-03-08 PROCEDURE — 6360000002 HC RX W HCPCS

## 2024-03-08 PROCEDURE — 93970 EXTREMITY STUDY: CPT

## 2024-03-08 PROCEDURE — 85652 RBC SED RATE AUTOMATED: CPT

## 2024-03-08 PROCEDURE — 93010 ELECTROCARDIOGRAM REPORT: CPT | Performed by: INTERNAL MEDICINE

## 2024-03-08 PROCEDURE — 73590 X-RAY EXAM OF LOWER LEG: CPT

## 2024-03-08 PROCEDURE — 86140 C-REACTIVE PROTEIN: CPT

## 2024-03-08 PROCEDURE — APPSS45 APP SPLIT SHARED TIME 31-45 MINUTES

## 2024-03-08 PROCEDURE — 84484 ASSAY OF TROPONIN QUANT: CPT

## 2024-03-08 PROCEDURE — 2580000003 HC RX 258

## 2024-03-08 PROCEDURE — 87040 BLOOD CULTURE FOR BACTERIA: CPT

## 2024-03-08 PROCEDURE — 85025 COMPLETE CBC W/AUTO DIFF WBC: CPT

## 2024-03-08 PROCEDURE — 96375 TX/PRO/DX INJ NEW DRUG ADDON: CPT

## 2024-03-08 PROCEDURE — 93005 ELECTROCARDIOGRAM TRACING: CPT | Performed by: PHYSICIAN ASSISTANT

## 2024-03-08 PROCEDURE — 99285 EMERGENCY DEPT VISIT HI MDM: CPT

## 2024-03-08 PROCEDURE — 6360000002 HC RX W HCPCS: Performed by: EMERGENCY MEDICINE

## 2024-03-08 PROCEDURE — 1200000000 HC SEMI PRIVATE

## 2024-03-08 RX ORDER — SODIUM CHLORIDE 0.9 % (FLUSH) 0.9 %
5-40 SYRINGE (ML) INJECTION PRN
Status: DISCONTINUED | OUTPATIENT
Start: 2024-03-08 | End: 2024-03-11 | Stop reason: HOSPADM

## 2024-03-08 RX ORDER — ONDANSETRON 2 MG/ML
4 INJECTION INTRAMUSCULAR; INTRAVENOUS EVERY 6 HOURS PRN
Status: DISCONTINUED | OUTPATIENT
Start: 2024-03-08 | End: 2024-03-11 | Stop reason: HOSPADM

## 2024-03-08 RX ORDER — POLYETHYLENE GLYCOL 3350 17 G/17G
17 POWDER, FOR SOLUTION ORAL DAILY PRN
Status: DISCONTINUED | OUTPATIENT
Start: 2024-03-08 | End: 2024-03-11 | Stop reason: HOSPADM

## 2024-03-08 RX ORDER — ACETAMINOPHEN 650 MG/1
650 SUPPOSITORY RECTAL EVERY 6 HOURS PRN
Status: DISCONTINUED | OUTPATIENT
Start: 2024-03-08 | End: 2024-03-11 | Stop reason: HOSPADM

## 2024-03-08 RX ORDER — ONDANSETRON 4 MG/1
4 TABLET, ORALLY DISINTEGRATING ORAL EVERY 8 HOURS PRN
Status: DISCONTINUED | OUTPATIENT
Start: 2024-03-08 | End: 2024-03-11 | Stop reason: HOSPADM

## 2024-03-08 RX ORDER — ENOXAPARIN SODIUM 100 MG/ML
40 INJECTION SUBCUTANEOUS DAILY
Status: DISCONTINUED | OUTPATIENT
Start: 2024-03-09 | End: 2024-03-11 | Stop reason: HOSPADM

## 2024-03-08 RX ORDER — SODIUM CHLORIDE 0.9 % (FLUSH) 0.9 %
5-40 SYRINGE (ML) INJECTION EVERY 12 HOURS SCHEDULED
Status: DISCONTINUED | OUTPATIENT
Start: 2024-03-08 | End: 2024-03-11 | Stop reason: HOSPADM

## 2024-03-08 RX ORDER — ACETAMINOPHEN 325 MG/1
650 TABLET ORAL EVERY 6 HOURS PRN
Status: DISCONTINUED | OUTPATIENT
Start: 2024-03-08 | End: 2024-03-11 | Stop reason: HOSPADM

## 2024-03-08 RX ORDER — SODIUM CHLORIDE 9 MG/ML
INJECTION, SOLUTION INTRAVENOUS PRN
Status: DISCONTINUED | OUTPATIENT
Start: 2024-03-08 | End: 2024-03-11 | Stop reason: HOSPADM

## 2024-03-08 RX ADMIN — PIPERACILLIN AND TAZOBACTAM 3375 MG: 3; .375 INJECTION, POWDER, FOR SOLUTION INTRAVENOUS at 23:48

## 2024-03-08 RX ADMIN — IOPAMIDOL 75 ML: 755 INJECTION, SOLUTION INTRAVENOUS at 19:03

## 2024-03-08 RX ADMIN — VANCOMYCIN HYDROCHLORIDE 2000 MG: 10 INJECTION, POWDER, LYOPHILIZED, FOR SOLUTION INTRAVENOUS at 18:55

## 2024-03-08 RX ADMIN — PIPERACILLIN AND TAZOBACTAM 4500 MG: 4; .5 INJECTION, POWDER, FOR SOLUTION INTRAVENOUS at 18:16

## 2024-03-08 NOTE — ED PROVIDER NOTES
HPI:  3/8/24,   Time: 4:33 PM PEPE Escalera is a 43 y.o. male presenting to the ED for daisy redness/prvs ivda/needles retained/on abx/cp, beginning weeks ago.  The complaint has been persistent, moderate in severity, and worsened by nothing.  Patient history IV drug use.  Has had recurrent cellulitis for past month.  2 rounds of antibiotics.  Seen at urgent care for same.  States has needle that broke off in legs.  Brought in by private vehicle.    Review of Systems:   Pertinent positives and negatives are stated within HPI, all other systems reviewed and are negative.          --------------------------------------------- PAST HISTORY ---------------------------------------------  Past Medical History:  has no past medical history on file.    Past Surgical History:  has a past surgical history that includes Hip fracture surgery (Right).    Social History:  reports that he has been smoking cigarettes. He started smoking about 30 years ago. He has a 30.2 pack-year smoking history. He has never used smokeless tobacco. He reports current drug use. Drugs: Opiates  and IV. He reports that he does not drink alcohol.    Family History: family history is not on file.     The patient’s home medications have been reviewed.    Allergies: Ceclor [cefaclor]        ---------------------------------------------------PHYSICAL EXAM--------------------------------------    Constitutional/General: Alert and oriented x3, well appearing, non toxic in NAD  Head: Normocephalic and atraumatic  Eyes: PERRL, EOMI, conjunctive normal, sclera non icteric  Mouth: Oropharynx clear, handling secretions,   Neck: Supple, full ROM,   Respiratory: . Not in respiratory distress  Cardiovascular:  Regular rate. Regular rhythm. . 2+ distal pulses  Chest: No chest wall tenderness  GI:  Abdomen Soft, Non tender, Non distended.  +BS. No organomegaly, no palpable masses,  No rebound, guarding, or rigidity.   Musculoskeletal: Moves all extremities x

## 2024-03-09 LAB
ALBUMIN SERPL-MCNC: 3.4 G/DL (ref 3.5–5.2)
ALP SERPL-CCNC: 109 U/L (ref 40–129)
ALT SERPL-CCNC: 36 U/L (ref 0–40)
ANION GAP SERPL CALCULATED.3IONS-SCNC: 5 MMOL/L (ref 7–16)
AST SERPL-CCNC: 27 U/L (ref 0–39)
BASOPHILS # BLD: 0.05 K/UL (ref 0–0.2)
BASOPHILS NFR BLD: 1 % (ref 0–2)
BILIRUB SERPL-MCNC: 0.2 MG/DL (ref 0–1.2)
BUN SERPL-MCNC: 8 MG/DL (ref 6–20)
CALCIUM SERPL-MCNC: 8.5 MG/DL (ref 8.6–10.2)
CHLORIDE SERPL-SCNC: 103 MMOL/L (ref 98–107)
CO2 SERPL-SCNC: 28 MMOL/L (ref 22–29)
CREAT SERPL-MCNC: 1 MG/DL (ref 0.7–1.2)
CRP SERPL HS-MCNC: 21 MG/L (ref 0–5)
EOSINOPHIL # BLD: 0.13 K/UL (ref 0.05–0.5)
EOSINOPHILS RELATIVE PERCENT: 2 % (ref 0–6)
ERYTHROCYTE [DISTWIDTH] IN BLOOD BY AUTOMATED COUNT: 13.9 % (ref 11.5–15)
ERYTHROCYTE [SEDIMENTATION RATE] IN BLOOD BY WESTERGREN METHOD: 12 MM/HR (ref 0–15)
GFR SERPL CREATININE-BSD FRML MDRD: >60 ML/MIN/1.73M2
GLUCOSE SERPL-MCNC: 96 MG/DL (ref 74–99)
HCT VFR BLD AUTO: 34.2 % (ref 37–54)
HGB BLD-MCNC: 11 G/DL (ref 12.5–16.5)
IMM GRANULOCYTES # BLD AUTO: <0.03 K/UL (ref 0–0.58)
IMM GRANULOCYTES NFR BLD: 0 % (ref 0–5)
INR PPP: 1
LYMPHOCYTES NFR BLD: 2.7 K/UL (ref 1.5–4)
LYMPHOCYTES RELATIVE PERCENT: 47 % (ref 20–42)
MCH RBC QN AUTO: 25.4 PG (ref 26–35)
MCHC RBC AUTO-ENTMCNC: 32.2 G/DL (ref 32–34.5)
MCV RBC AUTO: 79 FL (ref 80–99.9)
MONOCYTES NFR BLD: 0.72 K/UL (ref 0.1–0.95)
MONOCYTES NFR BLD: 13 % (ref 2–12)
NEUTROPHILS NFR BLD: 37 % (ref 43–80)
NEUTS SEG NFR BLD: 2.15 K/UL (ref 1.8–7.3)
PLATELET # BLD AUTO: 336 K/UL (ref 130–450)
PMV BLD AUTO: 9.3 FL (ref 7–12)
POTASSIUM SERPL-SCNC: 4.2 MMOL/L (ref 3.5–5)
PROT SERPL-MCNC: 6.4 G/DL (ref 6.4–8.3)
PROTHROMBIN TIME: 11.2 SEC (ref 9.3–12.4)
RBC # BLD AUTO: 4.33 M/UL (ref 3.8–5.8)
SODIUM SERPL-SCNC: 136 MMOL/L (ref 132–146)
WBC OTHER # BLD: 5.8 K/UL (ref 4.5–11.5)

## 2024-03-09 PROCEDURE — 99254 IP/OBS CNSLTJ NEW/EST MOD 60: CPT | Performed by: SURGERY

## 2024-03-09 PROCEDURE — 80053 COMPREHEN METABOLIC PANEL: CPT

## 2024-03-09 PROCEDURE — 85610 PROTHROMBIN TIME: CPT

## 2024-03-09 PROCEDURE — 6360000002 HC RX W HCPCS

## 2024-03-09 PROCEDURE — 2580000003 HC RX 258

## 2024-03-09 PROCEDURE — 1200000000 HC SEMI PRIVATE

## 2024-03-09 PROCEDURE — 99232 SBSQ HOSP IP/OBS MODERATE 35: CPT | Performed by: STUDENT IN AN ORGANIZED HEALTH CARE EDUCATION/TRAINING PROGRAM

## 2024-03-09 PROCEDURE — 85025 COMPLETE CBC W/AUTO DIFF WBC: CPT

## 2024-03-09 RX ADMIN — PIPERACILLIN AND TAZOBACTAM 3375 MG: 3; .375 INJECTION, POWDER, FOR SOLUTION INTRAVENOUS at 18:44

## 2024-03-09 RX ADMIN — VANCOMYCIN HYDROCHLORIDE 1250 MG: 10 INJECTION, POWDER, LYOPHILIZED, FOR SOLUTION INTRAVENOUS at 22:12

## 2024-03-09 RX ADMIN — PIPERACILLIN AND TAZOBACTAM 3375 MG: 3; .375 INJECTION, POWDER, FOR SOLUTION INTRAVENOUS at 11:00

## 2024-03-09 RX ADMIN — SODIUM CHLORIDE, PRESERVATIVE FREE 10 ML: 5 INJECTION INTRAVENOUS at 08:52

## 2024-03-09 RX ADMIN — VANCOMYCIN HYDROCHLORIDE 1250 MG: 10 INJECTION, POWDER, LYOPHILIZED, FOR SOLUTION INTRAVENOUS at 08:52

## 2024-03-09 ASSESSMENT — PAIN DESCRIPTION - DESCRIPTORS: DESCRIPTORS: SORE;ACHING

## 2024-03-09 ASSESSMENT — PAIN DESCRIPTION - ONSET: ONSET: ON-GOING

## 2024-03-09 ASSESSMENT — PAIN DESCRIPTION - FREQUENCY: FREQUENCY: CONTINUOUS

## 2024-03-09 ASSESSMENT — PAIN DESCRIPTION - PAIN TYPE: TYPE: ACUTE PAIN

## 2024-03-09 ASSESSMENT — PAIN DESCRIPTION - ORIENTATION: ORIENTATION: RIGHT;LEFT;LOWER

## 2024-03-09 ASSESSMENT — PAIN SCALES - GENERAL: PAINLEVEL_OUTOF10: 6

## 2024-03-09 ASSESSMENT — PAIN DESCRIPTION - LOCATION: LOCATION: LEG;FOOT

## 2024-03-09 NOTE — CARE COORDINATION
Social Work/Discharge Planning:  Social Work consult noted.  Met with patient and completed initial assessment.  Explained Social Work role and discussed transition of care/discharge planning.  Patient lives with his wife and three year old daughter.  PTA he is independent and works.  He has counseling and methadone regimen at [facility].  He states he has been to \"many places\" for inpatient and outpatient drug treatment.  He states he used methamphetamines and fentanyl yesterday via IV.  He is interested in meeting with [facility] ([facility]).  Patient states he will confirm number, but requesting for  to call [facility], regarding what hepatitis blood work they will need to place patient on the Hep C pill.  He plans to return home with his family at discharge.  Sent message to Venessa with [facility] to confirm if she working today.  If no return call, then / will need to make referral on Monday 3/11 to [facility].  Will continue to follow and assist with discharge planning.  Electronically signed by LEVI Dimas on 3/9/2024 at 10:20 AM

## 2024-03-09 NOTE — CONSULTS
Surgery History and Physical/Consult Note    CC:    Bilateral lower extremity foreign body, cellulitis    HPI:  This is a 43 y.o. male with PMH below admitted 3/8/2024 with cellulitis of bilateral lower extremities.  This is been ongoing for a couple of months he states.  He is here with his wife and daughter.  He does skin pop IV injections.  He has broken a couple needles.      PMH:  No past medical history on file.    PSH:  Past Surgical History:   Procedure Laterality Date    HIP FRACTURE SURGERY Right        Family History:  Family History   Problem Relation Age of Onset    No Known Problems Mother     No Known Problems Father        Social History:   reports that he has been smoking cigarettes. He started smoking about 30 years ago. He has a 30.2 pack-year smoking history. He has never used smokeless tobacco. He reports current drug use. Drugs: Opiates  and IV. He reports that he does not drink alcohol.    Review of Systems:  A 14pt complete review of systems was performed, and all pertinent positives and negatives are listed in the HPI. All other systems are negative.    Allergies:  Allergies   Allergen Reactions    Ceclor [Cefaclor] Rash       Medications:  Home medications:   Prior to Admission medications    Medication Sig Start Date End Date Taking? Authorizing Provider   methadone 10 MG/5ML solution Take 40 mLs by mouth daily.    Provider, MD Constantin       Scheduled medications:   sodium chloride flush  5-40 mL IntraVENous 2 times per day    enoxaparin  40 mg SubCUTAneous Daily    piperacillin-tazobactam  3,375 mg IntraVENous Q8H    vancomycin  1,250 mg IntraVENous Q12H       PRN medications: sodium chloride flush, sodium chloride, ondansetron **OR** ondansetron, polyethylene glycol, acetaminophen **OR** acetaminophen    Objective:    Physical Examination:  Vitals:    03/08/24 2243 03/08/24 2323 03/09/24 0304 03/09/24 0825   BP: 105/75 120/82  116/65   Pulse: 90 98  84   Resp: 16 18  16   Temp: 98 °F

## 2024-03-09 NOTE — ED NOTES
ED to Inpatient Handoff Report    Notified Shanika that electronic handoff available and patient ready for transport to room 512.    Safety Risks: None identified    Patient in Restraints: no    Constant Observer or Patient : no    Telemetry Monitoring Ordered :No           Order to transfer to unit without monitor:N/A    Last MEWS:   Time completed: 2243    Deterioration Index Score:   Predictive Model Details          16 (Normal)  Factor Value    Calculated 3/8/2024 22:44 60% Age 43 years old    Deterioration Index Model 11% Potassium 4.2 mmol/L     10% Systolic 105     7% Sodium 135 mmol/L     5% Pulse oximetry 99 %     5% Pulse 90     1% Hematocrit 37.9 %     0% Temperature 98 °F (36.7 °C)     0% Respiratory rate 16     0% WBC count 6.6 k/uL        Vitals:    03/08/24 1449 03/08/24 1451 03/08/24 2243   BP:  106/86 105/75   Pulse: 98  90   Resp: 18  16   Temp: 97.9 °F (36.6 °C)  98 °F (36.7 °C)   TempSrc:   Oral   SpO2: 98%  99%   Weight: 79.4 kg (175 lb)     Height: 1.702 m (5' 7\")           Opportunity for questions and clarification was provided.

## 2024-03-09 NOTE — PLAN OF CARE
Problem: Pain  Goal: Verbalizes/displays adequate comfort level or baseline comfort level  3/9/2024 1019 by Reji Perry, RN  Outcome: Progressing  3/9/2024 0417 by Danielle Draper, RN  Outcome: Progressing

## 2024-03-09 NOTE — H&P
[facility]ist [facility] History and Physical      CHIEF COMPLAINT:  Chest pain    History of Present Illness:  This 43-year-old male with a past medical history of substance abuse who presents to the ED with chest pain.  Patient states he had been having intermittent dull, aching chest pain on the left with accompanying stiffness of his left arm, lasting several hours.  He states this has now resolved.  He reports he has retained needles from IV drug use in his bilateral lower extremities and has had recurrent cellulitis for the past month. He has taken an antibiotic that he was prescribed approximately 3 weeks ago at urgent care, but he did not take this as prescribed.  He injects fentanyl and methadone, last use was 2 days ago.  Denies associated fevers, chills, shortness of breath, abdominal pain, nausea, vomiting, dysuria, or diarrhea.  X-ray of left fibula shows a foreign object within the posterior soft tissues of the leg measuring 1.3 cm with diffuse soft tissue swelling.  X-ray of the right tibia shows a foreign object superimposing the lateral tibial plateau with diffuse soft tissue swelling.  Bilateral legs negative for DVT.  CTA negative for PE.  Labs unremarkable.  Patient received Zosyn 4500 mg and vancomycin 2000 mg in ED.  General surgery was consulted and patient will be admitted for further evaluation and treatment.    Informant(s) for H&P: Patient and chart    REVIEW OF SYSTEMS:  A comprehensive review of systems was negative except for: what is in the HPI      PMH:  No past medical history on file.    Surgical History:  Past Surgical History:   Procedure Laterality Date    HIP FRACTURE SURGERY Right        Medications Prior to Admission:    Prior to Admission medications    Medication Sig Start Date End Date Taking? Authorizing Provider   methadone 10 MG/5ML solution Take 20 mLs by mouth daily.    Provider, MD Constantin       Allergies:    Ceclor [cefaclor]    Social History:

## 2024-03-09 NOTE — ACP (ADVANCE CARE PLANNING)
Advance Care Planning   Healthcare Decision Maker:    Primary Decision Maker: Paris Escalera - [facility] - [phone]    Click here to complete Healthcare Decision Makers including selection of the Healthcare Decision Maker Relationship (ie \"Primary\").

## 2024-03-10 LAB
ALBUMIN SERPL-MCNC: 3.7 G/DL (ref 3.5–5.2)
ALP SERPL-CCNC: 115 U/L (ref 40–129)
ALT SERPL-CCNC: 38 U/L (ref 0–40)
ANION GAP SERPL CALCULATED.3IONS-SCNC: 6 MMOL/L (ref 7–16)
AST SERPL-CCNC: 27 U/L (ref 0–39)
BASOPHILS # BLD: 0.09 K/UL (ref 0–0.2)
BASOPHILS NFR BLD: 2 % (ref 0–2)
BILIRUB SERPL-MCNC: 0.2 MG/DL (ref 0–1.2)
BUN SERPL-MCNC: 10 MG/DL (ref 6–20)
CALCIUM SERPL-MCNC: 9 MG/DL (ref 8.6–10.2)
CHLORIDE SERPL-SCNC: 103 MMOL/L (ref 98–107)
CO2 SERPL-SCNC: 28 MMOL/L (ref 22–29)
CREAT SERPL-MCNC: 1.1 MG/DL (ref 0.7–1.2)
EOSINOPHIL # BLD: 0.13 K/UL (ref 0.05–0.5)
EOSINOPHILS RELATIVE PERCENT: 2 % (ref 0–6)
ERYTHROCYTE [DISTWIDTH] IN BLOOD BY AUTOMATED COUNT: 13.8 % (ref 11.5–15)
GFR SERPL CREATININE-BSD FRML MDRD: >60 ML/MIN/1.73M2
GLUCOSE SERPL-MCNC: 80 MG/DL (ref 74–99)
HCT VFR BLD AUTO: 37.5 % (ref 37–54)
HGB BLD-MCNC: 12 G/DL (ref 12.5–16.5)
IMM GRANULOCYTES # BLD AUTO: <0.03 K/UL (ref 0–0.58)
IMM GRANULOCYTES NFR BLD: 0 % (ref 0–5)
LYMPHOCYTES NFR BLD: 2.43 K/UL (ref 1.5–4)
LYMPHOCYTES RELATIVE PERCENT: 43 % (ref 20–42)
MCH RBC QN AUTO: 25.3 PG (ref 26–35)
MCHC RBC AUTO-ENTMCNC: 32 G/DL (ref 32–34.5)
MCV RBC AUTO: 79.1 FL (ref 80–99.9)
MONOCYTES NFR BLD: 0.5 K/UL (ref 0.1–0.95)
MONOCYTES NFR BLD: 9 % (ref 2–12)
NEUTROPHILS NFR BLD: 44 % (ref 43–80)
NEUTS SEG NFR BLD: 2.48 K/UL (ref 1.8–7.3)
PLATELET # BLD AUTO: 378 K/UL (ref 130–450)
PMV BLD AUTO: 9.1 FL (ref 7–12)
POTASSIUM SERPL-SCNC: 4.4 MMOL/L (ref 3.5–5)
PROT SERPL-MCNC: 7.1 G/DL (ref 6.4–8.3)
RBC # BLD AUTO: 4.74 M/UL (ref 3.8–5.8)
SODIUM SERPL-SCNC: 137 MMOL/L (ref 132–146)
VANCOMYCIN SERPL-MCNC: 9.1 UG/ML (ref 5–40)
WBC OTHER # BLD: 5.6 K/UL (ref 4.5–11.5)

## 2024-03-10 PROCEDURE — 87522 HEPATITIS C REVRS TRNSCRPJ: CPT

## 2024-03-10 PROCEDURE — 99232 SBSQ HOSP IP/OBS MODERATE 35: CPT | Performed by: SURGERY

## 2024-03-10 PROCEDURE — 36415 COLL VENOUS BLD VENIPUNCTURE: CPT

## 2024-03-10 PROCEDURE — 80053 COMPREHEN METABOLIC PANEL: CPT

## 2024-03-10 PROCEDURE — 1200000000 HC SEMI PRIVATE

## 2024-03-10 PROCEDURE — 87389 HIV-1 AG W/HIV-1&-2 AB AG IA: CPT

## 2024-03-10 PROCEDURE — 85025 COMPLETE CBC W/AUTO DIFF WBC: CPT

## 2024-03-10 PROCEDURE — 86592 SYPHILIS TEST NON-TREP QUAL: CPT

## 2024-03-10 PROCEDURE — 99232 SBSQ HOSP IP/OBS MODERATE 35: CPT | Performed by: STUDENT IN AN ORGANIZED HEALTH CARE EDUCATION/TRAINING PROGRAM

## 2024-03-10 PROCEDURE — 80074 ACUTE HEPATITIS PANEL: CPT

## 2024-03-10 PROCEDURE — 87902 NFCT AGT GNTYP ALYS HEP C: CPT

## 2024-03-10 PROCEDURE — 80202 ASSAY OF VANCOMYCIN: CPT

## 2024-03-10 PROCEDURE — 6360000002 HC RX W HCPCS

## 2024-03-10 PROCEDURE — 2580000003 HC RX 258

## 2024-03-10 RX ADMIN — PIPERACILLIN AND TAZOBACTAM 3375 MG: 3; .375 INJECTION, POWDER, FOR SOLUTION INTRAVENOUS at 03:29

## 2024-03-10 RX ADMIN — VANCOMYCIN HYDROCHLORIDE 1250 MG: 10 INJECTION, POWDER, LYOPHILIZED, FOR SOLUTION INTRAVENOUS at 13:26

## 2024-03-10 RX ADMIN — PIPERACILLIN AND TAZOBACTAM 3375 MG: 3; .375 INJECTION, POWDER, FOR SOLUTION INTRAVENOUS at 18:29

## 2024-03-10 RX ADMIN — PIPERACILLIN AND TAZOBACTAM 3375 MG: 3; .375 INJECTION, POWDER, FOR SOLUTION INTRAVENOUS at 10:36

## 2024-03-10 RX ADMIN — SODIUM CHLORIDE, PRESERVATIVE FREE 10 ML: 5 INJECTION INTRAVENOUS at 20:52

## 2024-03-10 RX ADMIN — SODIUM CHLORIDE, PRESERVATIVE FREE 10 ML: 5 INJECTION INTRAVENOUS at 09:11

## 2024-03-10 NOTE — PLAN OF CARE
Problem: Pain  Goal: Verbalizes/displays adequate comfort level or baseline comfort level  3/10/2024 1155 by Reji Peryr, RN  Outcome: Progressing  3/9/2024 2130 by Nayely Huggins, RN  Outcome: Progressing

## 2024-03-11 VITALS
DIASTOLIC BLOOD PRESSURE: 68 MMHG | BODY MASS INDEX: 29.27 KG/M2 | OXYGEN SATURATION: 99 % | RESPIRATION RATE: 18 BRPM | HEART RATE: 73 BPM | SYSTOLIC BLOOD PRESSURE: 119 MMHG | TEMPERATURE: 97.5 F | HEIGHT: 67 IN | WEIGHT: 186.5 LBS

## 2024-03-11 LAB
HIV 1+2 AB+HIV1 P24 AG SERPL QL IA: NONREACTIVE
RPR SER QL: NONREACTIVE

## 2024-03-11 PROCEDURE — 99232 SBSQ HOSP IP/OBS MODERATE 35: CPT | Performed by: SURGERY

## 2024-03-11 PROCEDURE — 99239 HOSP IP/OBS DSCHRG MGMT >30: CPT | Performed by: STUDENT IN AN ORGANIZED HEALTH CARE EDUCATION/TRAINING PROGRAM

## 2024-03-11 PROCEDURE — 2580000003 HC RX 258

## 2024-03-11 PROCEDURE — 6360000002 HC RX W HCPCS

## 2024-03-11 RX ORDER — AMOXICILLIN AND CLAVULANATE POTASSIUM 875; 125 MG/1; MG/1
1 TABLET, FILM COATED ORAL 2 TIMES DAILY
Qty: 10 TABLET | Refills: 0 | Status: SHIPPED | OUTPATIENT
Start: 2024-03-11 | End: 2024-03-16

## 2024-03-11 RX ORDER — DOXYCYCLINE HYCLATE 100 MG/1
100 CAPSULE ORAL 2 TIMES DAILY
Qty: 10 CAPSULE | Refills: 0 | Status: SHIPPED | OUTPATIENT
Start: 2024-03-11 | End: 2024-03-16

## 2024-03-11 RX ADMIN — PIPERACILLIN AND TAZOBACTAM 3375 MG: 3; .375 INJECTION, POWDER, FOR SOLUTION INTRAVENOUS at 02:36

## 2024-03-11 RX ADMIN — VANCOMYCIN HYDROCHLORIDE 1250 MG: 10 INJECTION, POWDER, LYOPHILIZED, FOR SOLUTION INTRAVENOUS at 00:32

## 2024-03-11 NOTE — PATIENT CARE CONFERENCE
P Quality Flow/Interdisciplinary Rounds Progress Note        Quality Flow Rounds held on March 11, 2024    Disciplines Attending:  Bedside Nurse, , , and Nursing Unit Leadership    Clement Escalera was admitted on 3/8/2024  4:22 PM    Anticipated Discharge Date:       Disposition:    Marcos Score:  Marcos Scale Score: 22    Readmission Risk              Risk of Unplanned Readmission:  13           Discussed patient goal for the day, patient clinical progression, and barriers to discharge.  The following Goal(s) of the Day/Commitment(s) have been identified:  Discharge - Obtain Order planning      Fern Hardin RN  March 11, 2024

## 2024-03-11 NOTE — CARE COORDINATION
[facility] Recovery [facility] Note    Name: Clement Escalera  Date: 3/11/2024    Chief Complaint   Patient presents with    Wound Check     Pt IV drug user and states he has broken needles stuck in bilat legs x2 months    Chest Pain       Peer Support met with patient.  [] Support and education provided  [] Resources provided   [] Treatment referral:   [x] Other:   [] Patient declined peer recovery services     Referred By: Selene (COLLINS)    Notes: Came to meet with patient, however, upon arrival patient is not present in his room. It appears that patient has already been discharged before [facility] was able to make a connection.     Peer did utilize patient contact number ([phone]) per chart to get in touch with patient and ensure recovery/treatment needs were met. Patient explained over the phone that he plans to stay connected with [place] [facility] for his methadone dosing, and possibly begin counseling with them as well. Peer discussed importance of having a support group as well as consistency in creating healthier habits to combat his ABDIAZIZ. Patient seemed receptive, however states that he also has an extremely busy life. Peer provided contact information and explained that patient can feel free to get in touch if any further assistance is desired in the future.    Signed: Venessa Welsh, 3/11/2024      [phone]

## 2024-03-11 NOTE — PROGRESS NOTES
[facility] - Hospitalist   Progress Note    Admitting Date and Time: 3/8/2024  4:22 PM  Admit Dx: Cellulitis [L03.90]  Cellulitis of lower extremity, unspecified laterality [L03.119]    Subjective:    Pt states he is having some improvement in the swelling. He states that he is working on getting treatment for his hepatitis C and he needs to get certain labs done they requested in order to start.     Per RN: Methadone clinic has not returned call to confirm dosing. Nurses have called multiple times.     ROS: denies fever, chills, cp, sob, n/v, HA unless stated above.     sodium chloride flush  5-40 mL IntraVENous 2 times per day    enoxaparin  40 mg SubCUTAneous Daily    piperacillin-tazobactam  3,375 mg IntraVENous Q8H    vancomycin  1,250 mg IntraVENous Q12H     sodium chloride flush, 5-40 mL, PRN  sodium chloride, , PRN  ondansetron, 4 mg, Q8H PRN   Or  ondansetron, 4 mg, Q6H PRN  polyethylene glycol, 17 g, Daily PRN  acetaminophen, 650 mg, Q6H PRN   Or  acetaminophen, 650 mg, Q6H PRN         Objective:    BP (!) 101/51   Pulse 92   Temp 98.7 °F (37.1 °C) (Oral)   Resp 16   Ht 1.702 m (5' 7\")   Wt 80.7 kg (178 lb)   SpO2 96%   BMI 27.88 kg/m²     General Appearance: alert and oriented to person, place and time and in no acute distress  Skin: warm and dry, wounds with surrounding erythema and edema on b/l LE - calves. Slightly improved. L calf with some induration, possible abscess forming  Head: normocephalic and atraumatic  Eyes: pupils equal, round, and reactive to light, extraocular eye movements intact, conjunctivae normal  Neck: neck supple and non tender without mass   Pulmonary/Chest: clear to auscultation bilaterally- no wheezes, rales or rhonchi, normal air movement, no respiratory distress  Cardiovascular: normal rate, normal S1 and S2 and no carotid bruits  Abdomen: soft, non-tender, non-distended, normal bowel sounds  Extremities: no cyanosis, no clubbing   Neurologic: no cranial nerve 
4 Eyes Skin Assessment     NAME:  Clement Escalera  YOB: 1980  MEDICAL RECORD NUMBER:  [id number]    The patient is being assessed for  Admission    I agree that at least one RN has performed a thorough Head to Toe Skin Assessment on the patient. ALL assessment sites listed below have been assessed.      Areas assessed by both nurses:    Head, Face, Ears, Shoulders, Back, Chest, Arms, Elbows, Hands, Sacrum. Buttock, Coccyx, Ischium, and Legs. Feet and Heels        Does the Patient have a Wound? No noted wound(s) dry scabbed needle marks BLE,   BLE swollen and red, induration.        Marcos Prevention initiated by RN: No  Wound Care Orders initiated by RN: No    Pressure Injury (Stage 3,4, Unstageable, DTI, NWPT, and Complex wounds) if present, place Wound referral order by RN under : No    New Ostomies, if present place, Ostomy referral order under : No     Nurse 1 eSignature: Electronically signed by Danielle Draper RN on 3/9/24 at 4:02 AM EST    **SHARE this note so that the co-signing nurse can place an eSignature**    Nurse 2 eSignature: {Esignature:[id number]}    
Attempted to call [place] treatment centers regarding patients dose of methadone that he receives weekly. Unfortunately I was unable to contact anyone at this time. The phone number for the [place] location is [phone]. They reopen on Monday. Electronically signed by Reji Perry RN on 3/9/2024 at 9:24 AM    
CLINICAL PHARMACY NOTE: MEDS TO BEDS    Total # of Prescriptions Filled: 2   The following medications were delivered to the patient:  Amoxicillin 875-125 mg  Doxycycline 100 mg    Additional Documentation:  
Pharmacy Consultation Note  (Antibiotic Dosing and Monitoring)    Initial consult date: 3/8/2024  Consulting physician/provider: QUYNH Tong  Drug: Vancomycin  Indication: SSTI    Age/  Gender Height Weight IBW  Allergy Information   43 y.o./male 170.2 cm (5'7\")  82.4 kg (181 lbs)    Ideal body weight: 66.1 kg (145 lb 11.6 oz)  Adjusted ideal body weight: 73.5 kg (162 lb 0.6 oz)   Ceclor [cefaclor]      Renal Function:  Recent Labs     03/08/24  1636 03/09/24  0400 03/10/24  1150   BUN 9 8 10   CREATININE 0.9 1.0 1.1       No intake or output data in the 24 hours ending 03/11/24 1238      Vancomycin Monitoring:  Trough:  No results for input(s): \"VANCOTROUGH\" in the last 72 hours.  Random:    Recent Labs     03/10/24  1150   VANCORANDOM 9.1         Vancomycin Administration Times:  Recent vancomycin administrations                     vancomycin (VANCOCIN) 1,250 mg in sodium chloride 0.9 % 250 mL IVPB (mg) 1,250 mg New Bag 03/11/24 0032     1,250 mg New Bag 03/10/24 1326     1,250 mg New Bag 03/09/24 2212     1,250 mg New Bag  0852    vancomycin (VANCOCIN) 2000 mg in 0.9% sodium chloride 500 mL IVPB (mg) 2,000 mg New Bag 03/08/24 1855                  Assessment:  Patient is a 43 y.o. male who has been initiated on vancomycin for SSTI   Estimated Creatinine Clearance: 91 mL/min (based on SCr of 1.1 mg/dL).  To dose vancomycin, pharmacy will be utilizing [employer] calculation software for goal AUC/KADI 400-600 mg/L     Plan:  Will continue vancomycin 1250 mg IV every 12 hours (est. ; Tr 15.3)   Will check vancomycin levels as necessary   Will continue to monitor renal function   Pharmacy to follow    Maximo ArenasD, [facility]  3/11/2024  12:41 PM    JUDD: [phone]  SEY: [phone]  SJW: [phone]  
Pharmacy Consultation Note  (Antibiotic Dosing and Monitoring)    Initial consult date: 3/8/24  Consulting physician/provider: Imtiaz  Drug: Vancomycin  Indication: SSTI    Age/  Gender Height Weight IBW  Allergy Information   43 y.o./male 170.2 cm (5'7\")  82.4 kg (181 lbs)    Ideal body weight: 66.1 kg (145 lb 11.6 oz)  Adjusted ideal body weight: 72 kg (158 lb 10.1 oz)   Ceclor [cefaclor]      Renal Function:  Recent Labs     03/08/24  1636 03/09/24  0400 03/10/24  1150   BUN 9 8 10   CREATININE 0.9 1.0 1.1         Intake/Output Summary (Last 24 hours) at 3/10/2024 1450  Last data filed at 3/10/2024 0930  Gross per 24 hour   Intake 180 ml   Output --   Net 180 ml       Vancomycin Monitoring:  Trough:  No results for input(s): \"VANCOTROUGH\" in the last 72 hours.  Random:    Recent Labs     03/10/24  1150   VANCORANDOM 9.1       Vancomycin Administration Times:  Recent vancomycin administrations                     vancomycin (VANCOCIN) 1,250 mg in sodium chloride 0.9 % 250 mL IVPB (mg) 1,250 mg New Bag 03/10/24 1326     1,250 mg New Bag 03/09/24 2212     1,250 mg New Bag  0852    vancomycin (VANCOCIN) 2000 mg in 0.9% sodium chloride 500 mL IVPB (mg) 2,000 mg New Bag 03/08/24 1855                     Assessment:  Patient is a 43 y.o. male who has been initiated on vancomycin for SSTI   Estimated Creatinine Clearance: 89 mL/min (based on SCr of 1.1 mg/dL).  To dose vancomycin, pharmacy will be utilizing [employer] calculation software for goal AUC/KADI 400-600 mg/L   3/9: Scr 1.   3/10: Scr 1.1. Vancomycin random level 9.1 mcg/mL (~ 12.5 hrs post dose). Blood cultures NGTD.     Plan:  Will continue vancomycin 1250 mg IV every 12 hours (est. ; Tr 15.3)   Will check vancomycin levels as necessary   Will continue to monitor renal function   Pharmacy to follow      Young Cline, PharmD, [facility]  PGY-1 Pharmacy Resident  [phone]  3/10/2024 2:50 PM    JUDD: [phone]  SEY: [phone]  W: [phone]    
Pharmacy Consultation Note  (Antibiotic Dosing and Monitoring)    Initial consult date: 3/8/24  Consulting physician/provider: Imtiaz  Drug: Vancomycin  Indication: SSTI    Age/  Gender Height Weight IBW  Allergy Information   43 y.o./male 170.2 cm (5'7\")  82.4 kg (181 lbs)    Ideal body weight: 66.1 kg (145 lb 11.6 oz)  Adjusted ideal body weight: 72.6 kg (160 lb 1.8 oz)   Ceclor [cefaclor]      Renal Function:  Recent Labs     03/08/24  1636 03/09/24  0400   BUN 9 8   CREATININE 0.9 1.0       No intake or output data in the 24 hours ending 03/09/24 0853    Vancomycin Monitoring:  Trough:  No results for input(s): \"VANCOTROUGH\" in the last 72 hours.  Random:  No results for input(s): \"VANCORANDOM\" in the last 72 hours.    Vancomycin Administration Times:  Recent vancomycin administrations                     vancomycin (VANCOCIN) 1,250 mg in sodium chloride 0.9 % 250 mL IVPB (mg) 1,250 mg New Bag 03/09/24 0852    vancomycin (VANCOCIN) 2000 mg in 0.9% sodium chloride 500 mL IVPB (mg) 2,000 mg New Bag 03/08/24 1855                       Assessment:  Patient is a 43 y.o. male who has been initiated on vancomycin for SSTI   Estimated Creatinine Clearance: 99 mL/min (based on SCr of 1 mg/dL).  To dose vancomycin, pharmacy will be utilizing [employer] calculation software for goal AUC/KADI 400-600 mg/L   3/9: Scr 1.     Plan:  Will continue vancomycin 1250 mg IV every 12 hours (est. ; Tr 14.9)   Will check a vancomycin random level tomorrow AM   Will continue to monitor renal function   Pharmacy to follow      Young Cline, PharmD, [facility]  PGY-1 Pharmacy Resident  [phone]  3/9/2024 8:53 AM    JUDD: [phone]  SEY: [phone]  SJW: [phone]    
Pharmacy Consultation Note  (Antibiotic Dosing and Monitoring)    Initial consult date: 3/8/24  Consulting physician/provider: Imtiaz  Drug: Vancomycin  Indication: SSTI    Age/  Gender Height Weight IBW  Allergy Information   43 y.o./male @FLOW(11:first:1)@ @FLOW[14:FIRST:1@     Ideal body weight: 66.1 kg (145 lb 11.6 oz)  Adjusted ideal body weight: 71.4 kg (157 lb 6.9 oz)   Ceclor [cefaclor]      Renal Function:  Recent Labs     03/08/24  1636   BUN 9   CREATININE 0.9     No intake or output data in the 24 hours ending 03/08/24 2320    Vancomycin Monitoring:  Trough:  No results for input(s): \"VANCOTROUGH\" in the last 72 hours.  Random:  No results for input(s): \"VANCORANDOM\" in the last 72 hours.    Vancomycin Administration Times:  Recent vancomycin administrations                     vancomycin (VANCOCIN) 2000 mg in 0.9% sodium chloride 500 mL IVPB (mg) 2,000 mg New Bag 03/08/24 1855                    Assessment:  Patient is a 43 y.o. male who has been initiated on vancomycin  Estimated Creatinine Clearance: 108 mL/min (based on SCr of 0.9 mg/dL).  To dose vancomycin, pharmacy will be utilizing [employer] calculation software for goal AUC/KADI 400-600 mg/L    Plan:  Will continue vancomycin 1250 mg IV every 12 hours  Will check vancomycin levels when appropriate  Will continue to monitor renal function   Pharmacy to follow      John Hernandez RPH 3/8/2024 11:20 PM    JUDD: [phone]  SEY: [phone]  SJW: [phone]    
Pt refused am labs. Wanted them drawn later even after explained they were timed for 0600.      
[facility] called to verify patient's Methadone dosage of 80mg liquid daily.      Nayely Huggins RN 7:00 AM    
Spoke with representative (Graciela) from [facility]. She is supposed to relay message to provider for dose of methadone treatment. Awaiting response. Electronically signed by Reji Perry RN on 3/9/2024 at 10:30 AM    
        Assessment/Plan:    Patient Active Problem List   Diagnosis    Opioid type dependence (HCC)    Cellulitis    Viral hepatitis C    Heroin use disorder, mild, in early remission (HCC)    Pain of lower extremity    Foreign body of left cornea    Closed head injury    History of hepatitis C    IV drug user    Tobacco abuse    IV drug abuse (HCC)    Chest pain       43 y.o. male with bilateral lower extremity cellulitis related to IV drug abuse    -Okay for discharge from general surgery perspective.  -No plans for drainage with significant treatment on IV antibiotics and no evidence of drainable fluid collection.  -Okay for discharge on p.o. antibiotics  -Discussed with Dr. Danny Gunter DO  General Surgery Resident, PGY-2    Electronically signed on 3/11/2024 at 7:56 AM      Attending Physician Statement:    Chief Complaint:   Chief Complaint   Patient presents with    Wound Check     Pt IV drug user and states he has broken needles stuck in bilat legs x2 months    Chest Pain       I have examined the patient and performed the key aspects of physical exam, reviewed the record (including all new notes, pertinent radiology images which are independently reviewed and laboratory findings), and discussed the case with the surgical team.  I agree with the assessment and plan with the following additions, corrections, and changes. 14pt review of symptoms completed and negative except as mentioned.    No drainable fluid collection, some induration.  Continue antibiotics.  No surgical intervention at this time.    Paul Delgado MD  03/11/24      NOTE: This report, in part or full, may have been transcribed using voice recognition software. Every effort was made to ensure accuracy; however, inadvertent computerized transcription errors may be present. Please excuse any transcriptional grammatical or spelling errors that may have escaped my editorial review.        
on cessation    Chronic opioid use  -Reports using methadone 80mg QD  -Awaiting confirmation from clinic  -Is not listed in [place]    Tobacco use disorder  -Counseled on cessation    NOTE: This report was transcribed using voice recognition software. Every effort was made to ensure accuracy; however, inadvertent computerized transcription errors may be present.     Electronically signed by Laila Tapia MD on 3/9/2024 at 1:33 PM                  
transcriptional grammatical or spelling errors that may have escaped my editorial review.

## 2024-03-11 NOTE — DISCHARGE SUMMARY
[facility] Hospitalist       Hospitalist Physician Discharge Summary       Geneva Vaz, APRN - NP    Schedule an appointment as soon as possible for a visit in 1 week(s)        Activity level: as tolerated    Diet: ADULT DIET; Regular    Labs:as per PCP    Dispo:Home    Condition at discharge: Stable      Patient ID:  Clement Escalera  [id number]  43 y.o.  1980    Admit date: 3/8/2024    Discharge date and time:  3/11/2024  11:45 AM    Admission Diagnoses: Principal Problem:    Cellulitis  Active Problems:    History of hepatitis C    IV drug user    Tobacco abuse    Chest pain  Resolved Problems:    * No resolved hospital problems. *      Discharge Diagnoses: Principal Problem:    Cellulitis  Active Problems:    History of hepatitis C    IV drug user    Tobacco abuse    Chest pain  Resolved Problems:    * No resolved hospital problems. *      Consults:  IP CONSULT TO GENERAL SURGERY  IP CONSULT TO SOCIAL WORK  PHARMACY TO DOSE VANCOMYCIN  IP CONSULT TO IV TEAM  IP CONSULT TO IV TEAM    Procedures: None    Hospital Course: Patient was admitted with cellulitis of bilateral LE after IV drug use injection. Imagining showed multiple retained needles. Patient was started on vanc and zosyn and general surgery was consulted. General surgery stated no surgical intervention indicated and cleared for discharge on PO antibiotics. Patient was counseled on stopping IV drug use. He was also advised to get treatment for his hepatitis C and labwork was done that he needed to get that. He should FU with his PCP and for hep C treatment.     Discharge Exam:  Vitals:    03/10/24 0812 03/10/24 1834 03/11/24 0306 03/11/24 0715   BP: 102/68 (!) 107/59  119/68   Pulse: 90 70  73   Resp: 18 18  18   Temp: 98.6 °F (37 °C) 98.4 °F (36.9 °C)  97.5 °F (36.4 °C)   TempSrc: Oral Oral  Oral   SpO2: 98% 98%  99%   Weight:   84.6 kg (186 lb 8 oz)    Height:           General Appearance: alert and oriented to person, place and time and in no

## 2024-03-11 NOTE — CARE COORDINATION
Social Work discharge planning  SW follow up from 3/9. SW called referral to Venessa from [facility]. Pt reportedly active with [facility]. Pt gave Rn list of labs that he wants done to reportedly qualify for a hepatitis medication program. Pt said plan remains home with family.  Electronically signed by KARSTEN Irizarry on 3/11/2024 at 9:37 AM

## 2024-03-12 LAB
HCV RNA SERPL NAA+PROBE-ACNC: ABNORMAL IU/ML
HCV RNA SERPL NAA+PROBE-LOG IU: 6.05 LOG IU/ML
HCV RNA SERPL QL NAA+PROBE: DETECTED

## 2024-03-13 LAB
HAV IGM SERPL QL IA: NONREACTIVE
HBV CORE IGM SERPL QL IA: NONREACTIVE
HBV SURFACE AG SERPL QL IA: NONREACTIVE
HCV AB SERPL QL IA: REACTIVE
MICROORGANISM SPEC CULT: NORMAL
MICROORGANISM SPEC CULT: NORMAL
SERVICE CMNT-IMP: NORMAL
SERVICE CMNT-IMP: NORMAL
SPECIMEN DESCRIPTION: NORMAL
SPECIMEN DESCRIPTION: NORMAL

## 2024-03-15 LAB
HCV GENTYP SERPL NAA+PROBE: NORMAL
HCV GENTYP SERPL NAA+PROBE: NORMAL

## 2024-10-25 ENCOUNTER — HOSPITAL ENCOUNTER (INPATIENT)
Age: 44
LOS: 1 days | Discharge: LEFT BEFORE DISCHARGE | DRG: 720 | End: 2024-10-27
Attending: STUDENT IN AN ORGANIZED HEALTH CARE EDUCATION/TRAINING PROGRAM | Admitting: HOSPITALIST
Payer: COMMERCIAL

## 2024-10-25 ENCOUNTER — APPOINTMENT (OUTPATIENT)
Dept: GENERAL RADIOLOGY | Age: 44
DRG: 720 | End: 2024-10-25
Payer: COMMERCIAL

## 2024-10-25 ENCOUNTER — APPOINTMENT (OUTPATIENT)
Dept: ULTRASOUND IMAGING | Age: 44
DRG: 720 | End: 2024-10-25
Payer: COMMERCIAL

## 2024-10-25 DIAGNOSIS — L03.116 CELLULITIS OF LEFT LOWER EXTREMITY: Primary | ICD-10-CM

## 2024-10-25 PROCEDURE — 83735 ASSAY OF MAGNESIUM: CPT

## 2024-10-25 PROCEDURE — 80048 BASIC METABOLIC PNL TOTAL CA: CPT

## 2024-10-25 PROCEDURE — 83605 ASSAY OF LACTIC ACID: CPT

## 2024-10-25 PROCEDURE — 93971 EXTREMITY STUDY: CPT

## 2024-10-25 PROCEDURE — 99285 EMERGENCY DEPT VISIT HI MDM: CPT

## 2024-10-25 PROCEDURE — 96367 TX/PROPH/DG ADDL SEQ IV INF: CPT

## 2024-10-25 PROCEDURE — 85025 COMPLETE CBC W/AUTO DIFF WBC: CPT

## 2024-10-25 PROCEDURE — 73590 X-RAY EXAM OF LOWER LEG: CPT

## 2024-10-25 PROCEDURE — 84100 ASSAY OF PHOSPHORUS: CPT

## 2024-10-25 PROCEDURE — 87040 BLOOD CULTURE FOR BACTERIA: CPT

## 2024-10-25 ASSESSMENT — PAIN DESCRIPTION - ORIENTATION: ORIENTATION: LEFT

## 2024-10-25 ASSESSMENT — PAIN DESCRIPTION - DESCRIPTORS: DESCRIPTORS: ACHING

## 2024-10-25 ASSESSMENT — PAIN DESCRIPTION - PAIN TYPE: TYPE: ACUTE PAIN

## 2024-10-25 ASSESSMENT — PAIN DESCRIPTION - LOCATION: LOCATION: LEG

## 2024-10-25 ASSESSMENT — PAIN SCALES - GENERAL: PAINLEVEL_OUTOF10: 9

## 2024-10-25 ASSESSMENT — PAIN - FUNCTIONAL ASSESSMENT: PAIN_FUNCTIONAL_ASSESSMENT: 0-10

## 2024-10-26 ENCOUNTER — APPOINTMENT (OUTPATIENT)
Dept: CT IMAGING | Age: 44
DRG: 720 | End: 2024-10-26
Payer: COMMERCIAL

## 2024-10-26 PROBLEM — J69.0 PNEUMONITIS DUE TO INHALATION OF FOOD AND VOMIT (HCC): Status: ACTIVE | Noted: 2017-12-23

## 2024-10-26 PROBLEM — T40.1X1A POISONING BY HEROIN, ACCIDENTAL (UNINTENTIONAL), INITIAL ENCOUNTER (HCC): Status: ACTIVE | Noted: 2017-12-23

## 2024-10-26 PROBLEM — I46.9 CARDIAC ARREST, CAUSE UNSPECIFIED: Status: ACTIVE | Noted: 2017-12-23

## 2024-10-26 PROBLEM — L03.116 CELLULITIS OF LEFT LOWER EXTREMITY: Status: ACTIVE | Noted: 2024-10-26

## 2024-10-26 PROBLEM — L03.116 CELLULITIS OF LEFT LOWER EXTREMITY: Chronic | Status: ACTIVE | Noted: 2024-10-26

## 2024-10-26 PROBLEM — S80.852A FOREIGN BODY IN LEFT LOWER EXTREMITY: Status: ACTIVE | Noted: 2024-10-26

## 2024-10-26 LAB
ANION GAP SERPL CALCULATED.3IONS-SCNC: 9 MMOL/L (ref 7–16)
BASOPHILS # BLD: 0.05 K/UL (ref 0–0.2)
BASOPHILS NFR BLD: 0 % (ref 0–2)
BUN SERPL-MCNC: 14 MG/DL (ref 6–20)
CALCIUM SERPL-MCNC: 9 MG/DL (ref 8.6–10.2)
CHLORIDE SERPL-SCNC: 98 MMOL/L (ref 98–107)
CO2 SERPL-SCNC: 26 MMOL/L (ref 22–29)
CREAT SERPL-MCNC: 1.2 MG/DL (ref 0.7–1.2)
EOSINOPHIL # BLD: 0.01 K/UL (ref 0.05–0.5)
EOSINOPHILS RELATIVE PERCENT: 0 % (ref 0–6)
ERYTHROCYTE [DISTWIDTH] IN BLOOD BY AUTOMATED COUNT: 15.2 % (ref 11.5–15)
GFR, ESTIMATED: 75 ML/MIN/1.73M2
GLUCOSE SERPL-MCNC: 96 MG/DL (ref 74–99)
HCT VFR BLD AUTO: 30.5 % (ref 37–54)
HGB BLD-MCNC: 9.7 G/DL (ref 12.5–16.5)
IMM GRANULOCYTES # BLD AUTO: 0.13 K/UL (ref 0–0.58)
IMM GRANULOCYTES NFR BLD: 1 % (ref 0–5)
LACTATE BLDV-SCNC: 1.1 MMOL/L (ref 0.5–2.2)
LYMPHOCYTES NFR BLD: 2.29 K/UL (ref 1.5–4)
LYMPHOCYTES RELATIVE PERCENT: 18 % (ref 20–42)
MAGNESIUM SERPL-MCNC: 2 MG/DL (ref 1.6–2.6)
MCH RBC QN AUTO: 24.1 PG (ref 26–35)
MCHC RBC AUTO-ENTMCNC: 31.8 G/DL (ref 32–34.5)
MCV RBC AUTO: 75.9 FL (ref 80–99.9)
MONOCYTES NFR BLD: 1.14 K/UL (ref 0.1–0.95)
MONOCYTES NFR BLD: 9 % (ref 2–12)
NEUTROPHILS NFR BLD: 72 % (ref 43–80)
NEUTS SEG NFR BLD: 9.37 K/UL (ref 1.8–7.3)
PHOSPHATE SERPL-MCNC: 3 MG/DL (ref 2.5–4.5)
PLATELET # BLD AUTO: 325 K/UL (ref 130–450)
PMV BLD AUTO: 9.6 FL (ref 7–12)
POTASSIUM SERPL-SCNC: 4 MMOL/L (ref 3.5–5)
RBC # BLD AUTO: 4.02 M/UL (ref 3.8–5.8)
SODIUM SERPL-SCNC: 133 MMOL/L (ref 132–146)
WBC OTHER # BLD: 13 K/UL (ref 4.5–11.5)

## 2024-10-26 PROCEDURE — 96365 THER/PROPH/DIAG IV INF INIT: CPT

## 2024-10-26 PROCEDURE — 2580000003 HC RX 258: Performed by: HOSPITALIST

## 2024-10-26 PROCEDURE — 6360000002 HC RX W HCPCS: Performed by: STUDENT IN AN ORGANIZED HEALTH CARE EDUCATION/TRAINING PROGRAM

## 2024-10-26 PROCEDURE — 1200000000 HC SEMI PRIVATE

## 2024-10-26 PROCEDURE — 6360000002 HC RX W HCPCS: Performed by: HOSPITALIST

## 2024-10-26 PROCEDURE — 6370000000 HC RX 637 (ALT 250 FOR IP): Performed by: HOSPITALIST

## 2024-10-26 PROCEDURE — 96367 TX/PROPH/DG ADDL SEQ IV INF: CPT

## 2024-10-26 PROCEDURE — 87081 CULTURE SCREEN ONLY: CPT

## 2024-10-26 PROCEDURE — 99223 1ST HOSP IP/OBS HIGH 75: CPT | Performed by: HOSPITALIST

## 2024-10-26 PROCEDURE — 2580000003 HC RX 258: Performed by: PHYSICIAN ASSISTANT

## 2024-10-26 PROCEDURE — 6360000002 HC RX W HCPCS: Performed by: PHYSICIAN ASSISTANT

## 2024-10-26 PROCEDURE — 99255 IP/OBS CONSLTJ NEW/EST HI 80: CPT | Performed by: SURGERY

## 2024-10-26 RX ORDER — SODIUM CHLORIDE 0.9 % (FLUSH) 0.9 %
5-40 SYRINGE (ML) INJECTION EVERY 12 HOURS SCHEDULED
Status: DISCONTINUED | OUTPATIENT
Start: 2024-10-26 | End: 2024-10-27 | Stop reason: HOSPADM

## 2024-10-26 RX ORDER — TRAZODONE HYDROCHLORIDE 50 MG/1
50 TABLET, FILM COATED ORAL NIGHTLY
Status: DISCONTINUED | OUTPATIENT
Start: 2024-10-26 | End: 2024-10-27 | Stop reason: HOSPADM

## 2024-10-26 RX ORDER — ENOXAPARIN SODIUM 100 MG/ML
40 INJECTION SUBCUTANEOUS DAILY
Status: DISCONTINUED | OUTPATIENT
Start: 2024-10-26 | End: 2024-10-27 | Stop reason: HOSPADM

## 2024-10-26 RX ORDER — POLYETHYLENE GLYCOL 3350 17 G/17G
17 POWDER, FOR SOLUTION ORAL DAILY PRN
Status: DISCONTINUED | OUTPATIENT
Start: 2024-10-26 | End: 2024-10-27 | Stop reason: HOSPADM

## 2024-10-26 RX ORDER — ACETAMINOPHEN 325 MG/1
650 TABLET ORAL EVERY 6 HOURS PRN
Status: DISCONTINUED | OUTPATIENT
Start: 2024-10-26 | End: 2024-10-27 | Stop reason: HOSPADM

## 2024-10-26 RX ORDER — PIPERACILLIN SODIUM, TAZOBACTAM SODIUM 3; .375 G/15ML; G/15ML
3375 INJECTION, POWDER, LYOPHILIZED, FOR SOLUTION INTRAVENOUS EVERY 6 HOURS
Status: DISCONTINUED | OUTPATIENT
Start: 2024-10-26 | End: 2024-10-27 | Stop reason: HOSPADM

## 2024-10-26 RX ORDER — TRAZODONE HYDROCHLORIDE 50 MG/1
50 TABLET, FILM COATED ORAL NIGHTLY
Status: ON HOLD | COMMUNITY
End: 2024-10-31 | Stop reason: HOSPADM

## 2024-10-26 RX ORDER — CLONIDINE HYDROCHLORIDE 0.1 MG/1
0.1 TABLET ORAL 2 TIMES DAILY
Status: ON HOLD | COMMUNITY
End: 2024-10-31 | Stop reason: HOSPADM

## 2024-10-26 RX ORDER — SENNOSIDES A AND B 8.6 MG/1
1 TABLET, FILM COATED ORAL DAILY PRN
Status: DISCONTINUED | OUTPATIENT
Start: 2024-10-26 | End: 2024-10-27 | Stop reason: HOSPADM

## 2024-10-26 RX ORDER — LANOLIN ALCOHOL/MO/W.PET/CERES
3 CREAM (GRAM) TOPICAL NIGHTLY PRN
Status: DISCONTINUED | OUTPATIENT
Start: 2024-10-26 | End: 2024-10-27

## 2024-10-26 RX ORDER — KETOROLAC TROMETHAMINE 15 MG/ML
15 INJECTION, SOLUTION INTRAMUSCULAR; INTRAVENOUS EVERY 6 HOURS PRN
Status: DISCONTINUED | OUTPATIENT
Start: 2024-10-26 | End: 2024-10-27 | Stop reason: HOSPADM

## 2024-10-26 RX ORDER — TRAMADOL HYDROCHLORIDE 50 MG/1
50 TABLET ORAL EVERY 6 HOURS PRN
Status: DISCONTINUED | OUTPATIENT
Start: 2024-10-26 | End: 2024-10-27 | Stop reason: HOSPADM

## 2024-10-26 RX ORDER — POLYETHYLENE GLYCOL 3350 17 G
2 POWDER IN PACKET (EA) ORAL
Status: DISCONTINUED | OUTPATIENT
Start: 2024-10-26 | End: 2024-10-27 | Stop reason: HOSPADM

## 2024-10-26 RX ORDER — MAGNESIUM HYDROXIDE/ALUMINUM HYDROXICE/SIMETHICONE 120; 1200; 1200 MG/30ML; MG/30ML; MG/30ML
30 SUSPENSION ORAL EVERY 6 HOURS PRN
Status: DISCONTINUED | OUTPATIENT
Start: 2024-10-26 | End: 2024-10-27 | Stop reason: HOSPADM

## 2024-10-26 RX ORDER — SODIUM CHLORIDE 9 MG/ML
INJECTION, SOLUTION INTRAVENOUS PRN
Status: DISCONTINUED | OUTPATIENT
Start: 2024-10-26 | End: 2024-10-27 | Stop reason: HOSPADM

## 2024-10-26 RX ORDER — SODIUM CHLORIDE 9 MG/ML
10 INJECTION, SOLUTION INTRAMUSCULAR; INTRAVENOUS; SUBCUTANEOUS EVERY 6 HOURS
Status: DISCONTINUED | OUTPATIENT
Start: 2024-10-26 | End: 2024-10-27 | Stop reason: HOSPADM

## 2024-10-26 RX ORDER — CLONIDINE HYDROCHLORIDE 0.1 MG/1
0.1 TABLET ORAL 2 TIMES DAILY
Status: DISCONTINUED | OUTPATIENT
Start: 2024-10-26 | End: 2024-10-27 | Stop reason: HOSPADM

## 2024-10-26 RX ORDER — DOXYCYCLINE 100 MG/1
100 CAPSULE ORAL 2 TIMES DAILY
COMMUNITY
End: 2024-10-26 | Stop reason: ALTCHOICE

## 2024-10-26 RX ORDER — PANTOPRAZOLE SODIUM 40 MG/1
40 TABLET, DELAYED RELEASE ORAL
Status: DISCONTINUED | OUTPATIENT
Start: 2024-10-26 | End: 2024-10-27 | Stop reason: HOSPADM

## 2024-10-26 RX ORDER — GLECAPREVIR AND PIBRENTASVIR 40; 100 MG/1; MG/1
1 TABLET, FILM COATED ORAL DAILY
Status: ON HOLD | COMMUNITY
Start: 2024-04-24 | End: 2024-10-31 | Stop reason: HOSPADM

## 2024-10-26 RX ORDER — ACETAMINOPHEN 650 MG/1
650 SUPPOSITORY RECTAL EVERY 6 HOURS PRN
Status: DISCONTINUED | OUTPATIENT
Start: 2024-10-26 | End: 2024-10-27 | Stop reason: HOSPADM

## 2024-10-26 RX ORDER — ONDANSETRON 4 MG/1
4 TABLET, ORALLY DISINTEGRATING ORAL EVERY 8 HOURS PRN
Status: DISCONTINUED | OUTPATIENT
Start: 2024-10-26 | End: 2024-10-27 | Stop reason: HOSPADM

## 2024-10-26 RX ORDER — METHADONE HYDROCHLORIDE 10 MG/1
10 TABLET ORAL 2 TIMES DAILY
Status: DISCONTINUED | OUTPATIENT
Start: 2024-10-26 | End: 2024-10-27

## 2024-10-26 RX ORDER — ONDANSETRON 2 MG/ML
4 INJECTION INTRAMUSCULAR; INTRAVENOUS EVERY 6 HOURS PRN
Status: DISCONTINUED | OUTPATIENT
Start: 2024-10-26 | End: 2024-10-27 | Stop reason: HOSPADM

## 2024-10-26 RX ORDER — SODIUM CHLORIDE 0.9 % (FLUSH) 0.9 %
5-40 SYRINGE (ML) INJECTION PRN
Status: DISCONTINUED | OUTPATIENT
Start: 2024-10-26 | End: 2024-10-27 | Stop reason: HOSPADM

## 2024-10-26 RX ORDER — METHADONE HYDROCHLORIDE 10 MG/1
80 TABLET ORAL DAILY
Status: DISCONTINUED | OUTPATIENT
Start: 2024-10-26 | End: 2024-10-26

## 2024-10-26 RX ADMIN — SODIUM CHLORIDE 10 ML: 9 INJECTION INTRAMUSCULAR; INTRAVENOUS; SUBCUTANEOUS at 13:00

## 2024-10-26 RX ADMIN — SODIUM CHLORIDE 10 ML: 9 INJECTION INTRAMUSCULAR; INTRAVENOUS; SUBCUTANEOUS at 18:21

## 2024-10-26 RX ADMIN — VANCOMYCIN HYDROCHLORIDE 1000 MG: 1 INJECTION, POWDER, LYOPHILIZED, FOR SOLUTION INTRAVENOUS at 13:07

## 2024-10-26 RX ADMIN — HYDROMORPHONE HYDROCHLORIDE 1 MG: 1 INJECTION, SOLUTION INTRAMUSCULAR; INTRAVENOUS; SUBCUTANEOUS at 03:06

## 2024-10-26 RX ADMIN — PIPERACILLIN AND TAZOBACTAM 3375 MG: 3; .375 INJECTION, POWDER, LYOPHILIZED, FOR SOLUTION INTRAVENOUS at 18:21

## 2024-10-26 RX ADMIN — VANCOMYCIN HYDROCHLORIDE 1500 MG: 5 INJECTION, POWDER, LYOPHILIZED, FOR SOLUTION INTRAVENOUS at 01:53

## 2024-10-26 RX ADMIN — PIPERACILLIN AND TAZOBACTAM 3375 MG: 3; .375 INJECTION, POWDER, LYOPHILIZED, FOR SOLUTION INTRAVENOUS at 01:11

## 2024-10-26 RX ADMIN — SODIUM CHLORIDE, PRESERVATIVE FREE 10 ML: 5 INJECTION INTRAVENOUS at 08:31

## 2024-10-26 RX ADMIN — PIPERACILLIN AND TAZOBACTAM 3375 MG: 3; .375 INJECTION, POWDER, LYOPHILIZED, FOR SOLUTION INTRAVENOUS at 12:59

## 2024-10-26 RX ADMIN — CLONIDINE HYDROCHLORIDE 0.1 MG: 0.1 TABLET ORAL at 21:14

## 2024-10-26 RX ADMIN — PANTOPRAZOLE SODIUM 40 MG: 40 TABLET, DELAYED RELEASE ORAL at 06:00

## 2024-10-26 RX ADMIN — HYDROMORPHONE HYDROCHLORIDE 1 MG: 1 INJECTION, SOLUTION INTRAMUSCULAR; INTRAVENOUS; SUBCUTANEOUS at 05:30

## 2024-10-26 RX ADMIN — PIPERACILLIN AND TAZOBACTAM 3375 MG: 3; .375 INJECTION, POWDER, LYOPHILIZED, FOR SOLUTION INTRAVENOUS at 06:00

## 2024-10-26 RX ADMIN — TRAMADOL HYDROCHLORIDE 50 MG: 50 TABLET, COATED ORAL at 08:31

## 2024-10-26 RX ADMIN — KETOROLAC TROMETHAMINE 15 MG: 15 INJECTION, SOLUTION INTRAMUSCULAR; INTRAVENOUS at 13:08

## 2024-10-26 RX ADMIN — TRAZODONE HYDROCHLORIDE 50 MG: 50 TABLET ORAL at 21:14

## 2024-10-26 RX ADMIN — TRAMADOL HYDROCHLORIDE 50 MG: 50 TABLET, COATED ORAL at 18:21

## 2024-10-26 RX ADMIN — CLONIDINE HYDROCHLORIDE 0.1 MG: 0.1 TABLET ORAL at 08:31

## 2024-10-26 RX ADMIN — SODIUM CHLORIDE, PRESERVATIVE FREE 10 ML: 5 INJECTION INTRAVENOUS at 21:16

## 2024-10-26 RX ADMIN — SODIUM CHLORIDE 10 ML: 9 INJECTION INTRAMUSCULAR; INTRAVENOUS; SUBCUTANEOUS at 06:00

## 2024-10-26 ASSESSMENT — PAIN DESCRIPTION - ORIENTATION
ORIENTATION: LEFT

## 2024-10-26 ASSESSMENT — PAIN - FUNCTIONAL ASSESSMENT
PAIN_FUNCTIONAL_ASSESSMENT: PREVENTS OR INTERFERES WITH MANY ACTIVE NOT PASSIVE ACTIVITIES
PAIN_FUNCTIONAL_ASSESSMENT: PREVENTS OR INTERFERES WITH ALL ACTIVE AND SOME PASSIVE ACTIVITIES
PAIN_FUNCTIONAL_ASSESSMENT: PREVENTS OR INTERFERES WITH ALL ACTIVE AND SOME PASSIVE ACTIVITIES

## 2024-10-26 ASSESSMENT — PAIN DESCRIPTION - DESCRIPTORS
DESCRIPTORS: ACHING;SHARP
DESCRIPTORS: ACHING
DESCRIPTORS: ACHING;DISCOMFORT;THROBBING;STABBING
DESCRIPTORS: DISCOMFORT
DESCRIPTORS: ACHING;DISCOMFORT
DESCRIPTORS: ACHING;DISCOMFORT

## 2024-10-26 ASSESSMENT — PAIN SCALES - GENERAL
PAINLEVEL_OUTOF10: 8
PAINLEVEL_OUTOF10: 9
PAINLEVEL_OUTOF10: 6
PAINLEVEL_OUTOF10: 8
PAINLEVEL_OUTOF10: 10
PAINLEVEL_OUTOF10: 10
PAINLEVEL_OUTOF10: 8

## 2024-10-26 ASSESSMENT — PAIN DESCRIPTION - ONSET: ONSET: ON-GOING

## 2024-10-26 ASSESSMENT — PAIN DESCRIPTION - LOCATION
LOCATION: LEG

## 2024-10-26 ASSESSMENT — PAIN SCALES - WONG BAKER: WONGBAKER_NUMERICALRESPONSE: HURTS A LITTLE BIT

## 2024-10-26 ASSESSMENT — PAIN DESCRIPTION - FREQUENCY: FREQUENCY: CONTINUOUS

## 2024-10-26 ASSESSMENT — PAIN DESCRIPTION - PAIN TYPE: TYPE: ACUTE PAIN

## 2024-10-26 NOTE — H&P
(Hospital Encounter).        Allergies:    Ceclor [cefaclor]    Social History:    reports that he has been smoking cigarettes. He started smoking about 30 years ago. He has a 30.8 pack-year smoking history. He has never used smokeless tobacco. He reports current drug use. Drugs: Opiates  and IV. He reports that he does not drink alcohol.    Family History:   family history includes No Known Problems in his father and mother.       PHYSICAL EXAM:  Vitals:  /74   Pulse 97   Temp 99 °F (37.2 °C) (Oral)   Resp 16   Ht 1.7 m (5' 6.93\")   Wt 77.1 kg (170 lb)   SpO2 100%   BMI 26.68 kg/m²   Physical Exam  Vitals reviewed.   Constitutional:       General: He is awake. He is not in acute distress.  HENT:      Mouth/Throat:      Mouth: Mucous membranes are moist.   Eyes:      General: Vision grossly intact.      Extraocular Movements: Extraocular movements intact.      Conjunctiva/sclera: Conjunctivae normal.   Neck:      Thyroid: No thyroid mass.   Cardiovascular:      Rate and Rhythm: Normal rate and regular rhythm.   Pulmonary:      Effort: Pulmonary effort is normal.      Breath sounds: Normal breath sounds. No wheezing, rhonchi or rales.   Abdominal:      Palpations: Abdomen is soft.      Tenderness: There is no abdominal tenderness.   Musculoskeletal:      Cervical back: Neck supple.      Right lower leg: No edema.      Left lower leg: No edema.   Skin:     General: Skin is warm and dry.      Findings: Erythema and rash present. No wound.   Neurological:      General: No focal deficit present.      Mental Status: He is alert and oriented to person, place, and time.   Psychiatric:         Attention and Perception: Attention and perception normal.         Mood and Affect: Mood normal.         Speech: Speech normal.         Behavior: Behavior is cooperative.             LABS:  Recent Labs     10/25/24  2349      K 4.0   CL 98   CO2 26   BUN 14   CREATININE 1.2   GLUCOSE 96   CALCIUM 9.0       Recent

## 2024-10-26 NOTE — ED NOTES
ED to Inpatient Handoff Report    Notified Venessa that electronic handoff available and patient ready for transport to room 534.    Safety Risks: Risk of falls    Patient in Restraints: no    Constant Observer or Patient : no    Telemetry Monitoring Ordered :No           Order to transfer to unit without monitor:N/A    Last MEWS: 1 Time completed: 0303    Deterioration Index Score:   Predictive Model Details          18 (Normal)  Factor Value    Calculated 10/26/2024 03:10 46% Age 44 years old    Deterioration Index Model 20% WBC count abnormal (13.0 k/uL)     11% Pulse 97     9% Pulse oximetry 100 %     7% Sodium 133 mmol/L     6% Hematocrit abnormal (30.5 %)     1% Temperature 99 °F (37.2 °C)     1% Potassium 4.0 mmol/L     1% Systolic 110     0% Respiratory rate 16        Vitals:    10/25/24 2238 10/26/24 0113 10/26/24 0215 10/26/24 0303   BP: 108/64 112/72 101/72 110/74   Pulse: (!) 106 96  97   Resp: 18   16   Temp: 98.6 °F (37 °C)   99 °F (37.2 °C)   TempSrc: Temporal   Oral   SpO2: 100% 100% 100% 100%   Weight: 77.1 kg (170 lb)      Height: 1.7 m (5' 6.93\")            Opportunity for questions and clarification was provided.

## 2024-10-26 NOTE — CONSULTS
[facility] General Surgery Consult    Chief Complaint:  LLE swelling    HPI:  Clement Escalera is a 44 y.o. male with known history of IVDU who presented to the ED with LLE swelling and redness. He is known to my partner Dr. Delgado for a past hospitalization where he was found to have a needle fragment imbedded in his posterior calf. He did have cellulitis at that time as well. It sounds like things got better but he continues to inject into his lower legs and has wounds on bilateral lower extremities with associated cellulitis on the left.    PMH:  History reviewed. No pertinent past medical history.    PSH:  Past Surgical History:   Procedure Laterality Date    HIP FRACTURE SURGERY Right        MEDS:  Prior to Admission medications    Medication Sig Start Date End Date Taking? Authorizing Provider   Omeprazole 20 MG TBDD Take 20 mg by mouth daily   Yes Constantin Lott MD   glecaprevir-pibrentasvir (MAVYRET) 100-40 MG TABS tablet Take 1 each by mouth daily 4/24/24  Yes Consatntin Lott MD   cloNIDine (CATAPRES) 0.1 MG tablet Take 1 tablet by mouth 2 times daily   Yes Constantin Lott MD   traZODone (DESYREL) 50 MG tablet Take 1 tablet by mouth nightly   Yes Constantin Lott MD   methadone 10 MG/5ML solution Take 40 mLs by mouth daily.   Yes Constantin Lott MD       ALLERGIES:  Allergies   Allergen Reactions    Ceclor [Cefaclor] Rash       FHX:  Family History   Problem Relation Age of Onset    No Known Problems Mother     No Known Problems Father        SHX:   reports that he has been smoking cigarettes. He started smoking about 30 years ago. He has a 30.8 pack-year smoking history. He has never used smokeless tobacco. He reports current drug use. Drugs: Opiates  and IV. He reports that he does not drink alcohol.    ROS:  A 14pt complete review of systems was performed, and all pertinent positives and negatives are listed in the HPI. All other systems are negative.    OBJECTIVE:    Vitals:

## 2024-10-26 NOTE — ED PROVIDER NOTES
[facility] EMERGENCY DEPARTMENT  EMERGENCY DEPARTMENT ENCOUNTER        Pt Name: Clement Escalera  MRN: [id number]  Birthdate 1980  Date of evaluation: 10/25/2024  Provider: Dereck Wesley MD  PCP: Geneva Vaz, LIEN - NP  Note Started: 1:55 AM EDT 10/26/24    CHIEF COMPLAINT       Chief Complaint   Patient presents with    Leg Swelling     Pt was stuck with a needle on leg.  Leg is swollen, red and hot to the touch       HISTORY OF PRESENT ILLNESS: 1 or more Elements        Limitations to history : None    Clement Escalera is a 44 y.o. male who presents to the emergency for 1-1/2 days of fevers, chills, left leg erythema swelling and pain.  Patient has a history of IV drug use, says he has a few needles in that leg that are broken off and is concerned they may have been infected.  Denies any chest pain, abdominal pain, nausea, vomiting, diarrhea, shortness of breath.  Does smoke, denies any history of COPD or asthma.  Does admit to current IV drug use and says he is trying to get in with the methadone clinic currently.  Seen in conjunction with [facility] given need for admission    Nursing Notes were all reviewed and agreed with or any disagreements were addressed in the HPI.      REVIEW OF EXTERNAL NOTE :       Admission 3/8/2024 for cellulitis    REVIEW OF SYSTEMS :      Positives and Pertinent negatives as per HPI.     SURGICAL HISTORY     Past Surgical History:   Procedure Laterality Date    HIP FRACTURE SURGERY Right        CURRENTMEDICATIONS       Previous Medications    METHADONE 10 MG/5ML SOLUTION    Take 40 mLs by mouth daily.       ALLERGIES     Ceclor [cefaclor]    FAMILYHISTORY       Family History   Problem Relation Age of Onset    No Known Problems Mother     No Known Problems Father         SOCIAL HISTORY       Social History     Tobacco Use    Smoking status: Every Day     Current packs/day: 1.00     Average packs/day: 1 pack/day for 30.8 years (30.8 ttl pk-yrs)

## 2024-10-26 NOTE — PROGRESS NOTES
Message sent to Dr. Bernal regarding patient requesting stronger pain medication. Stating \"the dilaudid they gave me in the ER didn't touch me\".

## 2024-10-26 NOTE — ED PROVIDER NOTES
Shared PHILIPP-ED Attending Visit.  CC: No        [facility]  Department of Emergency Medicine   ED  Encounter Note  Admit Date/RoomTime: 10/25/2024 10:41 PM  ED Room: 0534/0534-A    NAME: Clement Escalera  : 1980  MRN: [id number]     Chief Complaint:  Leg Swelling (Pt was stuck with a needle on leg.  Leg is swollen, red and hot to the touch)    History of Present Illness       Clement Escalera is a 44 y.o. old male who presents to the emergency department by private vehicle, for sudden onset, worsening red, painful left lower leg which began 2 day(s) prior to arrival.  The symptoms were caused by patient reports he chronically has edema in his lower legs due to history of having hypodermic needles broken off and both legs had periodically getting cellulitis.  He reports he typically controls the edema with compression stockings.  He reports yesterday he fell at work hurting his left leg and then this morning he woke up and his entire leg was red, hot and in severe pain.  Since onset the symptoms have been persistent.   He has had cellulitis in similar location in the past.   His symptoms are associated with increasing pain and relieved by nothing.  He denies any difficulty breathing, fever, chills, abd pain, or drainage.  Patient reports he is still using IV heroin. He has hx of hep C.    ROS   Pertinent positives and negatives are stated within HPI, all other systems reviewed and are negative.    Past Medical History:  has no past medical history on file.    Surgical History:  has a past surgical history that includes Hip fracture surgery (Right).    Social History:  reports that he has been smoking cigarettes. He started smoking about 30 years ago. He has a 30.8 pack-year smoking history. He has never used smokeless tobacco. He reports current drug use. Drugs: Opiates  and IV. He reports that he does not drink alcohol.    Family History: family history includes No Known Problems in his father and

## 2024-10-26 NOTE — PROGRESS NOTES
Patient admitted by nighttime hospitalist earlier today.  Patient was seen and examined today.  Patient was admitted for LLE edema and pain.    GEN: NAD  Card: RRR  Pulm: CTAB  ABD: NTND, + BS  Ext: 3+ edema on LLE    Agree with H&P's assessment and plan, no changes    Electronically signed by Ilan Caballero MD on 10/26/2024 at 2:17 PM

## 2024-10-26 NOTE — PROGRESS NOTES
At this time patient is refusing CT scan. This Rn educated patient on risk of refusal, patient verbalized understanding.

## 2024-10-26 NOTE — PROGRESS NOTES
Pharmacy Consultation Note  (Antibiotic Dosing and Monitoring)    Initial consult date: 10/26/24  Consulting physician/provider: Ermias Bernal DO  Drug: Vancomycin  Indication: Skin and Soft Tissue Infection     Age/  Gender Height Weight IBW  Allergy Information   44 y.o./male 170 cm (5' 6.93\") 77.1 kg (170 lb)     Ideal body weight: 65.9 kg (145 lb 5.8 oz)  Adjusted ideal body weight: 70.4 kg (155 lb 3.5 oz)   Ceclor [cefaclor]      Renal Function:  Recent Labs     10/25/24  2349   BUN 14   CREATININE 1.2     No intake or output data in the 24 hours ending 10/26/24 0717    Vancomycin Monitoring:  Trough:  No results for input(s): \"VANCOTROUGH\" in the last 72 hours.  Random:  No results for input(s): \"VANCORANDOM\" in the last 72 hours.    Vancomycin Administration Times:  Recent vancomycin administrations                     vancomycin (VANCOCIN) 1,500 mg in sodium chloride 0.9 % 250 mL IVPB (mg) 1,500 mg New Bag 10/26/24 0153                    Assessment:  Patient is a 44 y.o. male who has been initiated on vancomycin cellulitis of left lower extremity.  Estimated Creatinine Clearance: 74 mL/min (based on SCr of 1.2 mg/dL).  To dose vancomycin, pharmacy will be utilizing  vancomycin trough levels.  44 yom with left leg swelling and pain.  History of IV drug use.  WBC 13, afebrile.  SCR 1.2.  Received 1500 mg load (~20 mg/kg) today at 01:53.     Plan:  Will continue vancomycin 1000 mg IV every 12 hours  Will check vancomycin level tomorrow with am labs.   Will continue to monitor renal function   Pharmacy to follow      Mart James PharmD  PGY-1 Pharmacy Practice Resident, [phone]  10/26/2024 7:18 AM

## 2024-10-27 VITALS
TEMPERATURE: 97.9 F | BODY MASS INDEX: 27.84 KG/M2 | OXYGEN SATURATION: 97 % | HEIGHT: 67 IN | HEART RATE: 88 BPM | DIASTOLIC BLOOD PRESSURE: 60 MMHG | WEIGHT: 177.4 LBS | SYSTOLIC BLOOD PRESSURE: 106 MMHG | RESPIRATION RATE: 16 BRPM

## 2024-10-27 PROCEDURE — 6360000002 HC RX W HCPCS: Performed by: HOSPITALIST

## 2024-10-27 PROCEDURE — 6370000000 HC RX 637 (ALT 250 FOR IP): Performed by: HOSPITALIST

## 2024-10-27 PROCEDURE — 36415 COLL VENOUS BLD VENIPUNCTURE: CPT

## 2024-10-27 PROCEDURE — 99232 SBSQ HOSP IP/OBS MODERATE 35: CPT | Performed by: SURGERY

## 2024-10-27 PROCEDURE — 6370000000 HC RX 637 (ALT 250 FOR IP): Performed by: STUDENT IN AN ORGANIZED HEALTH CARE EDUCATION/TRAINING PROGRAM

## 2024-10-27 PROCEDURE — 2580000003 HC RX 258: Performed by: HOSPITALIST

## 2024-10-27 RX ORDER — LANOLIN ALCOHOL/MO/W.PET/CERES
3 CREAM (GRAM) TOPICAL NIGHTLY
Status: DISCONTINUED | OUTPATIENT
Start: 2024-10-27 | End: 2024-10-27 | Stop reason: HOSPADM

## 2024-10-27 RX ORDER — PROMETHAZINE HYDROCHLORIDE 25 MG/1
25 TABLET ORAL EVERY 6 HOURS PRN
Status: DISCONTINUED | OUTPATIENT
Start: 2024-10-27 | End: 2024-10-27 | Stop reason: HOSPADM

## 2024-10-27 RX ORDER — HYDROXYZINE PAMOATE 25 MG/1
50 CAPSULE ORAL EVERY 8 HOURS PRN
Status: DISCONTINUED | OUTPATIENT
Start: 2024-10-27 | End: 2024-10-27 | Stop reason: HOSPADM

## 2024-10-27 RX ORDER — METHADONE HYDROCHLORIDE 10 MG/1
15 TABLET ORAL 2 TIMES DAILY
Status: DISCONTINUED | OUTPATIENT
Start: 2024-10-27 | End: 2024-10-27 | Stop reason: HOSPADM

## 2024-10-27 RX ORDER — CLONIDINE HYDROCHLORIDE 0.1 MG/1
0.1 TABLET ORAL EVERY 6 HOURS PRN
Status: DISCONTINUED | OUTPATIENT
Start: 2024-10-27 | End: 2024-10-27 | Stop reason: HOSPADM

## 2024-10-27 RX ADMIN — SODIUM CHLORIDE 10 ML: 9 INJECTION INTRAMUSCULAR; INTRAVENOUS; SUBCUTANEOUS at 01:49

## 2024-10-27 RX ADMIN — VANCOMYCIN HYDROCHLORIDE 1000 MG: 1 INJECTION, POWDER, LYOPHILIZED, FOR SOLUTION INTRAVENOUS at 00:41

## 2024-10-27 RX ADMIN — HYDROXYZINE PAMOATE 50 MG: 25 CAPSULE ORAL at 08:39

## 2024-10-27 RX ADMIN — CLONIDINE HYDROCHLORIDE 0.1 MG: 0.1 TABLET ORAL at 08:39

## 2024-10-27 RX ADMIN — PIPERACILLIN AND TAZOBACTAM 3375 MG: 3; .375 INJECTION, POWDER, LYOPHILIZED, FOR SOLUTION INTRAVENOUS at 01:49

## 2024-10-27 NOTE — PROGRESS NOTES
General Surgery Progress Note    Subjective:  Patient not interested in CT scan and is planning on leaving AMA due to not having his street drugs    Objective:  Vital signs reviewed  Gen: awake, alert, in NAD  HEENT: NCAT  Resp: non-labored breathing  CV: regular rate, distal pulses intact  Abd: SNTND  Skin: LLE cellulitis and edema improving    Assessment/Plan:  LLE cellulitis - improving  No indication for surgical intervention  Remainder of care per primary    Leon Sweeney DO  8:44 AM

## 2024-10-27 NOTE — DISCHARGE SUMMARY
[facility] Hospitalist Physician Discharge Summary       No follow-up provider specified.    Activity level: As tolerated     Dispo: AMA      Condition on discharge: Stable     Patient ID:  Clement Escalera  [id number]  44 y.o.  1980    Admit date: 10/25/2024    Discharge date and time:  10/27/2024  10:10 AM    Admission Diagnoses: Principal Problem:    Cellulitis of left lower extremity  Active Problems:    Viral hepatitis C    IV drug user    Foreign body in left lower extremity  Resolved Problems:    * No resolved hospital problems. *      Discharge Diagnoses: Principal Problem:    Cellulitis of left lower extremity  Active Problems:    Viral hepatitis C    IV drug user    Foreign body in left lower extremity  Resolved Problems:    * No resolved hospital problems. *      Consults:  IP CONSULT TO PRIMARY CARE PROVIDER  IP CONSULT TO SOCIAL WORK  PHARMACY TO DOSE VANCOMYCIN  IP CONSULT TO GENERAL SURGERY  IP CONSULT TO VASCULAR ACCESS TEAM    Procedures:     Hospital Course:   Patient Clement Escalera is a 44 y.o. with PMHx lower extremity cellulitis, IVDU, low back pain & tobacco use disorder who presented with Cellulitis of left lower extremity [L03.116]  He presented with LLE swelling, redness, found to have cellulitis, patient treated with Vancomycin and Zosyn for 1 day, evaluated by general surgery due to needle tips that are know to be in his leg, CTA of left leg ordered for further evaluation but patient refused, he became increasingly agitated and decided to leave AMA, per nursing staff patient attempted to leave without removing his IV, but was stopped and IV was removed. He left before I had a change to see him and discuss his case.    Discharge Exam:  Left AMA prior to being seen    I/O last 3 completed shifts:  In: 540 [P.O.:540]  Out: -   No intake/output data recorded.      LABS:  Recent Labs     10/25/24  2349      K 4.0   CL 98   CO2 26   BUN 14   CREATININE 1.2   GLUCOSE 96   CALCIUM

## 2024-10-27 NOTE — PROGRESS NOTES
Patient requesting methadone dose be increased and also for IVF or IV bolus for hydration. Attending APRN notified, states she will forward to admitting Dr. Bernal. Awaiting orders/response from doctor.    Spoke with Dr. Bernal on unit, he has received the message and will adjust orders.

## 2024-10-27 NOTE — PROGRESS NOTES
Patient refusing a.m. lab draw at this time 05:07    Patient refusing IV Zosyn and PO pantoprazole at this time 05:54.    Patient reporting pain with flushing IV site. No redness, warmth or edema at site per nursing assessment. Consult to IV team order placed per patient request.     06:27 Patient called nurse's station demanding \"to see his doctor\" as he now is not happy with the modified methadone order and is threatening to leave AMA as \"he takes street drugs and is starting to withdrawal and needs something that will help him\". Dr. Bernal notified of request via Systems Integration message.

## 2024-10-27 NOTE — PROGRESS NOTES
Patient came to desk stating he is leaving. Explained to patient this would be AMA - verbalized understanding. Attempted to leave with IV in arm. Educated patient it needs to be removed. Still refusing to let RN remove IV. Stated I would have to notify security - now patient agreeable. IV removed. Dr Caballero on unit and notified.

## 2024-10-27 NOTE — PLAN OF CARE
Problem: Discharge Planning  Goal: Discharge to home or other facility with appropriate resources  10/26/2024 2336 by Saba Nassar RN  Outcome: Progressing  10/26/2024 1206 by Fartun Camejo RN  Outcome: Progressing     Problem: Pain  Goal: Verbalizes/displays adequate comfort level or baseline comfort level  10/26/2024 2336 by Saba Nassar RN  Outcome: Progressing  10/26/2024 1206 by Fartun Camejo RN  Outcome: Progressing     Problem: Safety - Adult  Goal: Free from fall injury  10/26/2024 2336 by Saba Nassar RN  Outcome: Progressing  10/26/2024 1206 by Fartun Camejo RN  Outcome: Progressing     Problem: ABCDS Injury Assessment  Goal: Absence of physical injury  10/26/2024 2336 by Saba Nassar RN  Outcome: Progressing  10/26/2024 1206 by Fartun Camejo RN  Outcome: Progressing

## 2024-10-28 LAB
MICROORGANISM SPEC CULT: NORMAL
SPECIMEN DESCRIPTION: NORMAL

## 2024-10-29 ENCOUNTER — APPOINTMENT (OUTPATIENT)
Dept: CT IMAGING | Age: 44
End: 2024-10-29
Payer: COMMERCIAL

## 2024-10-29 ENCOUNTER — HOSPITAL ENCOUNTER (INPATIENT)
Age: 44
LOS: 2 days | Discharge: HOME | End: 2024-10-31
Attending: EMERGENCY MEDICINE | Admitting: STUDENT IN AN ORGANIZED HEALTH CARE EDUCATION/TRAINING PROGRAM
Payer: COMMERCIAL

## 2024-10-29 ENCOUNTER — APPOINTMENT (OUTPATIENT)
Dept: GENERAL RADIOLOGY | Age: 44
End: 2024-10-29
Payer: COMMERCIAL

## 2024-10-29 ENCOUNTER — APPOINTMENT (OUTPATIENT)
Dept: ULTRASOUND IMAGING | Age: 44
End: 2024-10-29
Payer: COMMERCIAL

## 2024-10-29 DIAGNOSIS — F19.90 IVDU (INTRAVENOUS DRUG USER): ICD-10-CM

## 2024-10-29 DIAGNOSIS — R60.0 EDEMA OF RIGHT LOWER EXTREMITY: ICD-10-CM

## 2024-10-29 DIAGNOSIS — L03.116 CELLULITIS OF LEFT LOWER EXTREMITY: Primary | ICD-10-CM

## 2024-10-29 DIAGNOSIS — B15.9 VIRAL HEPATITIS A WITHOUT HEPATIC COMA: ICD-10-CM

## 2024-10-29 LAB
ALBUMIN SERPL-MCNC: 3.7 G/DL (ref 3.5–5.2)
ALP SERPL-CCNC: 116 U/L (ref 40–129)
ALT SERPL-CCNC: 30 U/L (ref 0–40)
ANION GAP SERPL CALCULATED.3IONS-SCNC: 6 MMOL/L (ref 7–16)
AST SERPL-CCNC: 26 U/L (ref 0–39)
BASOPHILS # BLD: 0.08 K/UL (ref 0–0.2)
BASOPHILS NFR BLD: 1 % (ref 0–2)
BILIRUB SERPL-MCNC: 0.2 MG/DL (ref 0–1.2)
BUN SERPL-MCNC: 11 MG/DL (ref 6–20)
CALCIUM SERPL-MCNC: 9 MG/DL (ref 8.6–10.2)
CHLORIDE SERPL-SCNC: 99 MMOL/L (ref 98–107)
CO2 SERPL-SCNC: 31 MMOL/L (ref 22–29)
CREAT SERPL-MCNC: 0.9 MG/DL (ref 0.7–1.2)
EOSINOPHIL # BLD: 0.31 K/UL (ref 0.05–0.5)
EOSINOPHILS RELATIVE PERCENT: 3 % (ref 0–6)
ERYTHROCYTE [DISTWIDTH] IN BLOOD BY AUTOMATED COUNT: 15.4 % (ref 11.5–15)
GFR, ESTIMATED: >90 ML/MIN/1.73M2
GLUCOSE SERPL-MCNC: 90 MG/DL (ref 74–99)
HCT VFR BLD AUTO: 33.4 % (ref 37–54)
HGB BLD-MCNC: 10.4 G/DL (ref 12.5–16.5)
IMM GRANULOCYTES # BLD AUTO: 0.47 K/UL (ref 0–0.58)
IMM GRANULOCYTES NFR BLD: 4 % (ref 0–5)
LACTATE BLDV-SCNC: 1.1 MMOL/L (ref 0.5–2.2)
LYMPHOCYTES NFR BLD: 2.5 K/UL (ref 1.5–4)
LYMPHOCYTES RELATIVE PERCENT: 21 % (ref 20–42)
MCH RBC QN AUTO: 23.7 PG (ref 26–35)
MCHC RBC AUTO-ENTMCNC: 31.1 G/DL (ref 32–34.5)
MCV RBC AUTO: 76.1 FL (ref 80–99.9)
MONOCYTES NFR BLD: 0.89 K/UL (ref 0.1–0.95)
MONOCYTES NFR BLD: 8 % (ref 2–12)
NEUTROPHILS NFR BLD: 64 % (ref 43–80)
NEUTS SEG NFR BLD: 7.42 K/UL (ref 1.8–7.3)
PLATELET # BLD AUTO: 481 K/UL (ref 130–450)
PMV BLD AUTO: 9.3 FL (ref 7–12)
POTASSIUM SERPL-SCNC: 4.8 MMOL/L (ref 3.5–5)
PROT SERPL-MCNC: 8.6 G/DL (ref 6.4–8.3)
RBC # BLD AUTO: 4.39 M/UL (ref 3.8–5.8)
SODIUM SERPL-SCNC: 136 MMOL/L (ref 132–146)
WBC OTHER # BLD: 11.7 K/UL (ref 4.5–11.5)

## 2024-10-29 PROCEDURE — 2140000000 HC CCU INTERMEDIATE R&B

## 2024-10-29 PROCEDURE — 6370000000 HC RX 637 (ALT 250 FOR IP): Performed by: STUDENT IN AN ORGANIZED HEALTH CARE EDUCATION/TRAINING PROGRAM

## 2024-10-29 PROCEDURE — 99222 1ST HOSP IP/OBS MODERATE 55: CPT | Performed by: STUDENT IN AN ORGANIZED HEALTH CARE EDUCATION/TRAINING PROGRAM

## 2024-10-29 PROCEDURE — 6360000002 HC RX W HCPCS: Performed by: NURSE PRACTITIONER

## 2024-10-29 PROCEDURE — 87040 BLOOD CULTURE FOR BACTERIA: CPT

## 2024-10-29 PROCEDURE — 85025 COMPLETE CBC W/AUTO DIFF WBC: CPT

## 2024-10-29 PROCEDURE — 96375 TX/PRO/DX INJ NEW DRUG ADDON: CPT

## 2024-10-29 PROCEDURE — 99285 EMERGENCY DEPT VISIT HI MDM: CPT

## 2024-10-29 PROCEDURE — 73706 CT ANGIO LWR EXTR W/O&W/DYE: CPT

## 2024-10-29 PROCEDURE — 80053 COMPREHEN METABOLIC PANEL: CPT

## 2024-10-29 PROCEDURE — 83605 ASSAY OF LACTIC ACID: CPT

## 2024-10-29 PROCEDURE — 71045 X-RAY EXAM CHEST 1 VIEW: CPT

## 2024-10-29 PROCEDURE — 6360000004 HC RX CONTRAST MEDICATION: Performed by: RADIOLOGY

## 2024-10-29 PROCEDURE — 96374 THER/PROPH/DIAG INJ IV PUSH: CPT

## 2024-10-29 PROCEDURE — 80307 DRUG TEST PRSMV CHEM ANLYZR: CPT

## 2024-10-29 PROCEDURE — 6360000002 HC RX W HCPCS: Performed by: STUDENT IN AN ORGANIZED HEALTH CARE EDUCATION/TRAINING PROGRAM

## 2024-10-29 PROCEDURE — 2580000003 HC RX 258: Performed by: NURSE PRACTITIONER

## 2024-10-29 PROCEDURE — 84484 ASSAY OF TROPONIN QUANT: CPT

## 2024-10-29 PROCEDURE — 2580000003 HC RX 258: Performed by: STUDENT IN AN ORGANIZED HEALTH CARE EDUCATION/TRAINING PROGRAM

## 2024-10-29 PROCEDURE — 93971 EXTREMITY STUDY: CPT

## 2024-10-29 PROCEDURE — 93005 ELECTROCARDIOGRAM TRACING: CPT | Performed by: STUDENT IN AN ORGANIZED HEALTH CARE EDUCATION/TRAINING PROGRAM

## 2024-10-29 RX ORDER — BUPRENORPHINE HYDROCHLORIDE AND NALOXONE HYDROCHLORIDE DIHYDRATE 2; .5 MG/1; MG/1
2 TABLET SUBLINGUAL PRN
Status: DISCONTINUED | OUTPATIENT
Start: 2024-10-29 | End: 2024-10-31 | Stop reason: HOSPADM

## 2024-10-29 RX ORDER — MAGNESIUM SULFATE IN WATER 40 MG/ML
2000 INJECTION, SOLUTION INTRAVENOUS PRN
Status: DISCONTINUED | OUTPATIENT
Start: 2024-10-29 | End: 2024-10-31 | Stop reason: HOSPADM

## 2024-10-29 RX ORDER — POTASSIUM CHLORIDE 7.45 MG/ML
10 INJECTION INTRAVENOUS PRN
Status: DISCONTINUED | OUTPATIENT
Start: 2024-10-29 | End: 2024-10-31 | Stop reason: HOSPADM

## 2024-10-29 RX ORDER — POTASSIUM CHLORIDE 1500 MG/1
40 TABLET, EXTENDED RELEASE ORAL PRN
Status: DISCONTINUED | OUTPATIENT
Start: 2024-10-29 | End: 2024-10-31 | Stop reason: HOSPADM

## 2024-10-29 RX ORDER — POLYETHYLENE GLYCOL 3350 17 G/17G
17 POWDER, FOR SOLUTION ORAL DAILY PRN
Status: DISCONTINUED | OUTPATIENT
Start: 2024-10-29 | End: 2024-10-31 | Stop reason: HOSPADM

## 2024-10-29 RX ORDER — NICOTINE 21 MG/24HR
1 PATCH, TRANSDERMAL 24 HOURS TRANSDERMAL DAILY
Status: DISCONTINUED | OUTPATIENT
Start: 2024-10-30 | End: 2024-10-31 | Stop reason: HOSPADM

## 2024-10-29 RX ORDER — ACETAMINOPHEN 650 MG/1
650 SUPPOSITORY RECTAL EVERY 6 HOURS PRN
Status: DISCONTINUED | OUTPATIENT
Start: 2024-10-29 | End: 2024-10-31 | Stop reason: HOSPADM

## 2024-10-29 RX ORDER — KETOROLAC TROMETHAMINE 30 MG/ML
30 INJECTION, SOLUTION INTRAMUSCULAR; INTRAVENOUS EVERY 6 HOURS PRN
Status: DISCONTINUED | OUTPATIENT
Start: 2024-10-29 | End: 2024-10-31 | Stop reason: HOSPADM

## 2024-10-29 RX ORDER — IOPAMIDOL 755 MG/ML
75 INJECTION, SOLUTION INTRAVASCULAR
Status: COMPLETED | OUTPATIENT
Start: 2024-10-29 | End: 2024-10-29

## 2024-10-29 RX ORDER — SODIUM CHLORIDE 0.9 % (FLUSH) 0.9 %
5-40 SYRINGE (ML) INJECTION EVERY 12 HOURS SCHEDULED
Status: DISCONTINUED | OUTPATIENT
Start: 2024-10-29 | End: 2024-10-31 | Stop reason: HOSPADM

## 2024-10-29 RX ORDER — LANOLIN ALCOHOL/MO/W.PET/CERES
6 CREAM (GRAM) TOPICAL NIGHTLY
Status: DISCONTINUED | OUTPATIENT
Start: 2024-10-29 | End: 2024-10-31 | Stop reason: HOSPADM

## 2024-10-29 RX ORDER — KETOROLAC TROMETHAMINE 30 MG/ML
15 INJECTION, SOLUTION INTRAMUSCULAR; INTRAVENOUS ONCE
Status: COMPLETED | OUTPATIENT
Start: 2024-10-29 | End: 2024-10-29

## 2024-10-29 RX ORDER — ACETAMINOPHEN 325 MG/1
650 TABLET ORAL EVERY 6 HOURS PRN
Status: DISCONTINUED | OUTPATIENT
Start: 2024-10-29 | End: 2024-10-31 | Stop reason: HOSPADM

## 2024-10-29 RX ORDER — SODIUM CHLORIDE 9 MG/ML
INJECTION, SOLUTION INTRAVENOUS PRN
Status: DISCONTINUED | OUTPATIENT
Start: 2024-10-29 | End: 2024-10-31 | Stop reason: HOSPADM

## 2024-10-29 RX ORDER — SODIUM CHLORIDE 0.9 % (FLUSH) 0.9 %
5-40 SYRINGE (ML) INJECTION PRN
Status: DISCONTINUED | OUTPATIENT
Start: 2024-10-29 | End: 2024-10-31 | Stop reason: HOSPADM

## 2024-10-29 RX ORDER — ENOXAPARIN SODIUM 100 MG/ML
40 INJECTION SUBCUTANEOUS DAILY
Status: DISCONTINUED | OUTPATIENT
Start: 2024-10-30 | End: 2024-10-31 | Stop reason: HOSPADM

## 2024-10-29 RX ADMIN — VANCOMYCIN HYDROCHLORIDE 1250 MG: 1.25 INJECTION, POWDER, LYOPHILIZED, FOR SOLUTION INTRAVENOUS at 21:28

## 2024-10-29 RX ADMIN — KETOROLAC TROMETHAMINE 15 MG: 30 INJECTION, SOLUTION INTRAMUSCULAR at 19:03

## 2024-10-29 RX ADMIN — Medication 6 MG: at 23:13

## 2024-10-29 RX ADMIN — VANCOMYCIN HYDROCHLORIDE 750 MG: 750 INJECTION, POWDER, LYOPHILIZED, FOR SOLUTION INTRAVENOUS at 23:35

## 2024-10-29 RX ADMIN — IOPAMIDOL 75 ML: 755 INJECTION, SOLUTION INTRAVENOUS at 23:55

## 2024-10-29 ASSESSMENT — PAIN DESCRIPTION - DESCRIPTORS: DESCRIPTORS: DISCOMFORT;SORE;ACHING

## 2024-10-29 ASSESSMENT — ENCOUNTER SYMPTOMS
NAUSEA: 0
SHORTNESS OF BREATH: 0
VOMITING: 0

## 2024-10-29 ASSESSMENT — PAIN DESCRIPTION - LOCATION: LOCATION: LEG

## 2024-10-29 ASSESSMENT — PAIN SCALES - GENERAL
PAINLEVEL_OUTOF10: 2
PAINLEVEL_OUTOF10: 6

## 2024-10-29 ASSESSMENT — PAIN DESCRIPTION - ORIENTATION: ORIENTATION: LEFT

## 2024-10-30 ENCOUNTER — APPOINTMENT (OUTPATIENT)
Dept: ULTRASOUND IMAGING | Age: 44
End: 2024-10-30
Payer: COMMERCIAL

## 2024-10-30 LAB
AMPHET UR QL SCN: POSITIVE
ANION GAP SERPL CALCULATED.3IONS-SCNC: 6 MMOL/L (ref 7–16)
ANION GAP SERPL CALCULATED.3IONS-SCNC: 6 MMOL/L (ref 7–16)
BARBITURATES UR QL SCN: NEGATIVE
BASOPHILS # BLD: 0.05 K/UL (ref 0–0.2)
BASOPHILS NFR BLD: 1 % (ref 0–2)
BENZODIAZ UR QL: NEGATIVE
BUN SERPL-MCNC: 7 MG/DL (ref 6–20)
BUN SERPL-MCNC: 8 MG/DL (ref 6–20)
BUPRENORPHINE UR QL: NEGATIVE
CALCIUM SERPL-MCNC: 8.3 MG/DL (ref 8.6–10.2)
CALCIUM SERPL-MCNC: 8.4 MG/DL (ref 8.6–10.2)
CANNABINOIDS UR QL SCN: NEGATIVE
CHLORIDE SERPL-SCNC: 101 MMOL/L (ref 98–107)
CHLORIDE SERPL-SCNC: 102 MMOL/L (ref 98–107)
CO2 SERPL-SCNC: 27 MMOL/L (ref 22–29)
CO2 SERPL-SCNC: 30 MMOL/L (ref 22–29)
COCAINE UR QL SCN: POSITIVE
CREAT SERPL-MCNC: 0.8 MG/DL (ref 0.7–1.2)
CREAT SERPL-MCNC: 0.8 MG/DL (ref 0.7–1.2)
CRP SERPL HS-MCNC: 73 MG/L (ref 0–5)
CRP SERPL HS-MCNC: 82 MG/L (ref 0–5)
EKG ATRIAL RATE: 105 BPM
EKG P AXIS: 26 DEGREES
EKG P-R INTERVAL: 134 MS
EKG Q-T INTERVAL: 342 MS
EKG QRS DURATION: 80 MS
EKG QTC CALCULATION (BAZETT): 452 MS
EKG R AXIS: 9 DEGREES
EKG T AXIS: 20 DEGREES
EKG VENTRICULAR RATE: 105 BPM
EOSINOPHIL # BLD: 0.29 K/UL (ref 0.05–0.5)
EOSINOPHILS RELATIVE PERCENT: 3 % (ref 0–6)
ERYTHROCYTE [DISTWIDTH] IN BLOOD BY AUTOMATED COUNT: 15.6 % (ref 11.5–15)
FENTANYL UR QL: POSITIVE
GFR, ESTIMATED: >90 ML/MIN/1.73M2
GFR, ESTIMATED: >90 ML/MIN/1.73M2
GLUCOSE SERPL-MCNC: 104 MG/DL (ref 74–99)
GLUCOSE SERPL-MCNC: 112 MG/DL (ref 74–99)
HCT VFR BLD AUTO: 29.7 % (ref 37–54)
HGB BLD-MCNC: 9.2 G/DL (ref 12.5–16.5)
IMM GRANULOCYTES # BLD AUTO: 0.34 K/UL (ref 0–0.58)
IMM GRANULOCYTES NFR BLD: 4 % (ref 0–5)
LYMPHOCYTES NFR BLD: 2.02 K/UL (ref 1.5–4)
LYMPHOCYTES RELATIVE PERCENT: 22 % (ref 20–42)
MCH RBC QN AUTO: 23.7 PG (ref 26–35)
MCHC RBC AUTO-ENTMCNC: 31 G/DL (ref 32–34.5)
MCV RBC AUTO: 76.5 FL (ref 80–99.9)
METHADONE UR QL: NEGATIVE
MONOCYTES NFR BLD: 0.76 K/UL (ref 0.1–0.95)
MONOCYTES NFR BLD: 8 % (ref 2–12)
NEUTROPHILS NFR BLD: 63 % (ref 43–80)
NEUTS SEG NFR BLD: 5.77 K/UL (ref 1.8–7.3)
OPIATES UR QL SCN: NEGATIVE
OXYCODONE UR QL SCN: NEGATIVE
PCP UR QL SCN: NEGATIVE
PLATELET # BLD AUTO: 488 K/UL (ref 130–450)
PMV BLD AUTO: 10 FL (ref 7–12)
POTASSIUM SERPL-SCNC: 4.4 MMOL/L (ref 3.5–5)
POTASSIUM SERPL-SCNC: 5.3 MMOL/L (ref 3.5–5)
PROCALCITONIN SERPL-MCNC: 0.17 NG/ML (ref 0–0.08)
PROCALCITONIN SERPL-MCNC: 0.17 NG/ML (ref 0–0.08)
RBC # BLD AUTO: 3.88 M/UL (ref 3.8–5.8)
SODIUM SERPL-SCNC: 135 MMOL/L (ref 132–146)
SODIUM SERPL-SCNC: 137 MMOL/L (ref 132–146)
TEST INFORMATION: ABNORMAL
TROPONIN I SERPL HS-MCNC: <6 NG/L (ref 0–11)
WBC OTHER # BLD: 9.2 K/UL (ref 4.5–11.5)

## 2024-10-30 PROCEDURE — 76937 US GUIDE VASCULAR ACCESS: CPT

## 2024-10-30 PROCEDURE — 2140000000 HC CCU INTERMEDIATE R&B

## 2024-10-30 PROCEDURE — 86140 C-REACTIVE PROTEIN: CPT

## 2024-10-30 PROCEDURE — 93010 ELECTROCARDIOGRAM REPORT: CPT | Performed by: INTERNAL MEDICINE

## 2024-10-30 PROCEDURE — 6360000002 HC RX W HCPCS: Performed by: STUDENT IN AN ORGANIZED HEALTH CARE EDUCATION/TRAINING PROGRAM

## 2024-10-30 PROCEDURE — 99223 1ST HOSP IP/OBS HIGH 75: CPT | Performed by: SURGERY

## 2024-10-30 PROCEDURE — 84145 PROCALCITONIN (PCT): CPT

## 2024-10-30 PROCEDURE — 87389 HIV-1 AG W/HIV-1&-2 AB AG IA: CPT

## 2024-10-30 PROCEDURE — 80048 BASIC METABOLIC PNL TOTAL CA: CPT

## 2024-10-30 PROCEDURE — 85025 COMPLETE CBC W/AUTO DIFF WBC: CPT

## 2024-10-30 PROCEDURE — 36415 COLL VENOUS BLD VENIPUNCTURE: CPT

## 2024-10-30 PROCEDURE — 93971 EXTREMITY STUDY: CPT

## 2024-10-30 PROCEDURE — 99232 SBSQ HOSP IP/OBS MODERATE 35: CPT | Performed by: INTERNAL MEDICINE

## 2024-10-30 PROCEDURE — 2580000003 HC RX 258: Performed by: STUDENT IN AN ORGANIZED HEALTH CARE EDUCATION/TRAINING PROGRAM

## 2024-10-30 RX ADMIN — SODIUM CHLORIDE, PRESERVATIVE FREE 10 ML: 5 INJECTION INTRAVENOUS at 08:58

## 2024-10-30 RX ADMIN — SODIUM CHLORIDE 1500 MG: 9 INJECTION, SOLUTION INTRAVENOUS at 13:31

## 2024-10-30 RX ADMIN — SODIUM CHLORIDE 1500 MG: 9 INJECTION, SOLUTION INTRAVENOUS at 11:07

## 2024-10-30 ASSESSMENT — PAIN SCALES - WONG BAKER: WONGBAKER_NUMERICALRESPONSE: HURTS A LITTLE BIT

## 2024-10-30 ASSESSMENT — PAIN SCALES - GENERAL: PAINLEVEL_OUTOF10: 0

## 2024-10-30 NOTE — PLAN OF CARE
Problem: Safety - Adult  Goal: Free from fall injury  10/30/2024 0451 by Connie Maldonado, RN  Outcome: Progressing  10/30/2024 0220 by Nicola Galeana, RN  Outcome: Progressing     Problem: Chronic Conditions and Co-morbidities  Goal: Patient's chronic conditions and co-morbidity symptoms are monitored and maintained or improved  10/30/2024 0451 by Connie Maldonado, RN  Outcome: Progressing  10/30/2024 0220 by Nicola Galeana, RN  Outcome: Progressing     Problem: Discharge Planning  Goal: Discharge to home or other facility with appropriate resources  Outcome: Progressing     Problem: Pain  Goal: Verbalizes/displays adequate comfort level or baseline comfort level  Outcome: Progressing

## 2024-10-30 NOTE — CARE COORDINATION
10/30:  Transition of care:  Pt presented to the ER for left lower ext from home.  Pt left AMA from [facility] 2 days prior.  Pt is on room air at 99%, Iv Vancocin, Iv Toradol, PO Suboxone & SQ Lovenox.  ID consulted.  CM is waiting for GS & ID's plan to help with dc planning.  CM spoke with pt bedside.  Pt normally uses Urgent care but doesn't have a PCP.  Will need set up on dc.  Pt is open to using our in house pharmacy or [facility] in [place].  Pt lives with his wife & 4 yr old in a house with 2-3 steps to enter.  The bed/bathroom are on the 1st floor.  PTA pt was independent with no DME.  Pt states his dc plan is home & his wife can transport.  Pt states he has been talking with a Dr in [place] for his drug addiction to get set up with Methadone.  Sw/CM will continue to follow.  Electronically signed by Ana Levi RN on 10/30/2024 at 2:25 PM    Case Management Assessment  Initial Evaluation    Date/Time of Evaluation: 10/30/2024 2:40 PM  Assessment Completed by: Ana Levi RN    If patient is discharged prior to next notation, then this note serves as note for discharge by case management.    Patient Name: Clement Escalera                   YOB: 1980  Diagnosis: IVDU (intravenous drug user) [F19.90]  Cellulitis of left lower extremity [L03.116]  Edema of right lower extremity [R60.0]  Viral hepatitis A without hepatic coma [B15.9]                   Date / Time: 10/29/2024  3:52 PM    Patient Admission Status: Inpatient   Readmission Risk (Low < 19, Mod (19-27), High > 27): Readmission Risk Score: 11.8    Current PCP: Geneva Vaz, APRN - NP  PCP verified by ? Yes    Chart Reviewed: Yes      History Provided by: Patient  Patient Orientation: Alert and Oriented, Person, Place, Situation    Patient Cognition: Alert    Hospitalization in the last 30 days (Readmission):  Yes    If yes, Readmission Assessment in  Navigator will be completed.    Advance Directives:      Code Status: Full Code  provided with a choice of provider and agrees with the discharge plan. Freedom of choice list with basic dialogue that supports the patient's individualized plan of care/goals and shares the quality data associated with the providers was provided to:     Patient Representative Name:       The Patient and/or Patient Representative Agree with the Discharge Plan?      Ana Levi RN  Case Management Department  Ph: [phone]

## 2024-10-30 NOTE — PROGRESS NOTES
4 Eyes Skin Assessment     NAME:  Clement Escalera  YOB: 1980  MEDICAL RECORD NUMBER:  [id number]    The patient is being assessed for  Admission    I agree that at least one RN has performed a thorough Head to Toe Skin Assessment on the patient. ALL assessment sites listed below have been assessed.      Areas assessed by both nurses:    Head, Face, Ears, Shoulders, Back, Chest, Arms, Elbows, Hands, Sacrum. Buttock, Coccyx, Ischium, and Legs. Feet and Heels        Does the Patient have a Wound? No noted wound(s)       Marcos Prevention initiated by RN: Yes  Wound Care Orders initiated by RN: No    Pressure Injury (Stage 3,4, Unstageable, DTI, NWPT, and Complex wounds) if present, place Wound referral order by RN under : No    New Ostomies, if present place, Ostomy referral order under : No     Nurse 1 eSignature: Electronically signed by Connie Maldonado RN on 10/30/24 at 4:35 AM EDT    **SHARE this note so that the co-signing nurse can place an eSignature**    Nurse 2 eSignature: Electronically signed by Alisha Kelley RN on 10/30/24 at 5:00 AM EDT

## 2024-10-30 NOTE — PROGRESS NOTES
[facility] Hospitalist Progress Note    Admitting Date and Time: 10/29/2024  3:52 PM  Admit Dx: IVDU (intravenous drug user) [F19.90]  Cellulitis of left lower extremity [L03.116]  Edema of right lower extremity [R60.0]  Viral hepatitis A without hepatic coma [B15.9]    Synopsis: 43 yo male w/ hx of IVDU (heroin/fentanyl, meth), Hep C, tobacco use who p/w 5 days of LLE swelling, redness, and pain assoc w/ fever/chills and subsequent development of CP/dyspnea today. Denies abd pain, n/v/d. Was admitted at [facility] and left AMA on 10/27 due to going through opioid withdrawal in the hospital and feeling like he was not being treated for his withdrawal sxs. Returned to the ED 10/29 with worsening spreading of redness up to the left thigh.     Subjective:  Patient seen and examined at bedside.   Withdrawal slightly improved     ROS: denies fever, chills, cp, sob, n/v, HA unless stated above.      nicotine  1 patch TransDERmal Daily    melatonin  6 mg Oral Nightly    sodium chloride flush  5-40 mL IntraVENous 2 times per day    enoxaparin  40 mg SubCUTAneous Daily    vancomycin  1,500 mg IntraVENous Q12H     buprenorphine-naloxone, 2 tablet, PRN  sodium chloride flush, 5-40 mL, PRN  sodium chloride, , PRN  potassium chloride, 40 mEq, PRN   Or  potassium alternative oral replacement, 40 mEq, PRN   Or  potassium chloride, 10 mEq, PRN  magnesium sulfate, 2,000 mg, PRN  polyethylene glycol, 17 g, Daily PRN  acetaminophen, 650 mg, Q6H PRN   Or  acetaminophen, 650 mg, Q6H PRN  ketorolac, 30 mg, Q6H PRN         Objective:    BP (!) 115/102   Pulse (!) 110   Temp 97.9 °F (36.6 °C) (Oral)   Resp 19   Ht 1.702 m (5' 7\")   Wt 79.4 kg (175 lb)   SpO2 100%   BMI 27.41 kg/m²     General Appearance: alert and oriented to person, place and time and in no acute distress  Skin: warm and dry  Head: normocephalic and atraumatic  Eyes: pupils equal, round, and reactive to light, extraocular eye movements intact, conjunctivae  normal  Neck: neck supple and non tender without mass   Pulmonary/Chest: clear to auscultation bilaterally- no wheezes, rales or rhonchi, normal air movement, no respiratory distress  Cardiovascular: normal rate, normal S1 and S2 and no carotid bruits  Abdomen: soft, non-tender, non-distended, normal bowel sounds, no masses or organomegaly  Extremities: no cyanosis, no clubbing and no edema  Neurologic: no cranial nerve deficit and speech normal  Skin: multiple puncture wounds         Recent Labs     10/29/24  1842      K 4.8   CL 99   CO2 31*   BUN 11   CREATININE 0.9   GLUCOSE 90   CALCIUM 9.0       Recent Labs     10/29/24  1842   WBC 11.7*   RBC 4.39   HGB 10.4*   HCT 33.4*   MCV 76.1*   MCH 23.7*   MCHC 31.1*   RDW 15.4*   *   MPV 9.3       Radiology:   Vascular duplex lower extremity venous right         CTA LOWER EXTREMITY LEFT W CONTRAST   Final Result   Extensive subcutaneous edema and skin thickening, particularly in the distal   left lower extremity. No drainable fluid collection. No evidence of active   extravasation.         XR CHEST PORTABLE   Final Result   No sign of acute cardiopulmonary disease.         Vascular duplex lower extremity venous left   Final Result   No evidence of DVT in the left lower extremity.              Assessment:    Principal Problem:    Cellulitis of left lower extremity  Active Problems:    Polysubstance use disorder    Chest pain    Foreign body of left lower leg    IVDU (intravenous drug user)  Resolved Problems:    * No resolved hospital problems. *      Plan:  Left lower extremity cellulitis - WBC 11.7, continue on vancomycin per pharmacy dosing, general surgery following, ID consult.   IV drug use, Polysubstance abuse - UDS (+) for Meth, Cocaine, Fentanyl - on COWS protocol check HIV  Hepatitis C - supposed to be on mavyret, questionable compliance   Retained needle fragments - General surgery on board, spoke to [facility] who conveyed plans for removal of needle

## 2024-10-30 NOTE — H&P
[facility] Hospitalist Group History and Physical      CHIEF COMPLAINT:  LLE swelling and pain    History of Present Illness:  45 yo male w/ hx of IVDU (heroin/fentanyl, meth), Hep C, tobacco use who p/w 5 days of LLE swelling, redness, and pain assoc w/ fever/chills and subsequent development of CP/dyspnea today. Denies abd pain, n/v/d. Was admitted at [facility] and left AMA on 10/27 due to going through opioid withdrawal in the hospital and feeling like he was not being treated for his withdrawal sxs. Comes today with worsening spreading of redness up to the left thigh.         REVIEW OF SYSTEMS:  As per HPI.    PMH:  No past medical history on file.    Surgical History:  Past Surgical History:   Procedure Laterality Date    HIP FRACTURE SURGERY Right        Medications Prior to Admission:    Prior to Admission medications    Medication Sig Start Date End Date Taking? Authorizing Provider   Omeprazole 20 MG TBDD Take 20 mg by mouth daily    Constantin Lott MD   glecaprevir-pibrentasvir (MAVYRET) 100-40 MG TABS tablet Take 1 each by mouth daily 4/24/24   Constantin Lott MD   cloNIDine (CATAPRES) 0.1 MG tablet Take 1 tablet by mouth 2 times daily    Constantin Lott MD   traZODone (DESYREL) 50 MG tablet Take 1 tablet by mouth nightly    Constantin Lott MD   methadone 10 MG/5ML solution Take 40 mLs by mouth daily.    Constantin Lott MD       Allergies:    Ceclor [cefaclor]    Social History:    reports that he has been smoking cigarettes. He started smoking about 30 years ago. He has a 30.8 pack-year smoking history. He has never used smokeless tobacco. He reports current drug use. Drugs: Opiates  and IV. He reports that he does not drink alcohol.    Family History:   family history includes No Known Problems in his father and mother.       PHYSICAL EXAM:  Vitals:  /82   Pulse 99   Temp 97.7 °F (36.5 °C) (Temporal)   Resp 16   SpO2 97%       General: Well developed, well  PM    LABGLOM >60 03/10/2024 11:50 AM    GLUCOSE 90 10/29/2024 06:42 PM    CALCIUM 9.0 10/29/2024 06:42 PM    BILITOT 0.2 10/29/2024 06:42 PM    ALKPHOS 116 10/29/2024 06:42 PM    AST 26 10/29/2024 06:42 PM    ALT 30 10/29/2024 06:42 PM       Radiology:   Vascular duplex lower extremity venous left   Final Result   No evidence of DVT in the left lower extremity.         CTA LOWER EXTREMITY LEFT W CONTRAST    (Results Pending)   XR CHEST PORTABLE    (Results Pending)       EKG (Independent Interpretation): pending     ASSESSMENT:      Principal Problem:    Cellulitis of left lower extremity  Active Problems:    Polysubstance use disorder    Chest pain    Foreign body of left lower leg    IVDU (intravenous drug user)  Resolved Problems:    * No resolved hospital problems. *      PLAN:    LLE Cellulitis w/ LLE Foreign Body: start Vancomycin. Bcx pending. Will german area of erythema and monitor for clinical improvement. Given remnant needle in LLE, and per review of prior Surgery notes, will order CTA LLE for further evaluation. Consult Surgery - appreciate rec's.     CP / Dyspnea: check EKG, Trop, CXR.    IVDU / Polysubstance use: Monitor COWS scale w/ Suboxone PRN. Check UDS.     Tobacco use: Nicotine patch ordered.     Code Status: Full  DVT prophylaxis: Lovenox      NOTE: This report was transcribed using voice recognition software. Every effort was made to ensure accuracy; however, inadvertent computerized transcription errors may be present and meaning should be derived from surrounding context.   Electronically signed by Trey Eisenberg MD on 10/29/2024 at 9:53 PM

## 2024-10-30 NOTE — CONSULTS
[facility] CONSULT NOTE    Reason for Consult: Left lower extremity cellulitis   Requested by: Pushpa Mathis MD      Chief complaint: Left lower extremity swelling, redness, pain    History Obtained From: Patient and EMR    HISTORY OFPRESENT ILLNESS              The patient is a 44 y.o. male with history of hepatitis C, injection drug use, previously presented at [facility] on 10/25 for similar complaint found to have foreign body (left lower leg x-ray showing migration of metallic linear foreign body more proximal and adjacent to posterior tibial with accompanying generalized soft tissue swelling of leg) that has migrated deeper toward the posterior aspect of the tibia although likely unrelated to the retained foreign body per Surgery evaluation then signed out AMA on 10/27 apparently due to not getting relief from opioid withdrawal, presented on 10/29 with left lower extremity swelling, redness, pain for 5 days accompanied by fever and chills.  On admission, he was afebrile and hemodynamically stable with leukocytosis of 13,000.  Chest x-ray showed no signs of acute cardiopulmonary disease.  CTA of left lower extremity showed extensive subcutaneous edema and skin thickening in the distal left lower extremity with no drainable fluid collection and no evidence of active extravasation.  Blood cultures from 10/25 and 10/26 showed no growth to date.  MRSA nares culture on 10/26 was negative for MRSA.  Vancomycin was started on admission.  ID service was subsequently consulted for further recommendations.    Past Medical History  No past medical history on file.    Current Facility-Administered Medications   Medication Dose Route Frequency Provider Last Rate Last Admin    nicotine (NICODERM CQ) 14 MG/24HR 1 patch  1 patch TransDERmal Daily Trey Eisenberg MD        buprenorphine-naloxone (SUBOXONE) 2-0.5 MG SL tablet 2 tablet  2 tablet SubLINGual PRN Trey Eisenberg MD        melatonin tablet 6 mg  6 mg Oral

## 2024-10-30 NOTE — PROGRESS NOTES
Physician Progress Note      PATIENT:               YOSEPH NOEL  CSN #:                  [id number]  :                       1980  ADMIT DATE:       10/25/2024 10:41 PM  DISCH DATE:        10/27/2024 10:12 AM  RESPONDING  PROVIDER #:        Ilan Caballero MD          QUERY TEXT:    Pt admitted with Cellulitis of left lower extremity. Pt noted to have Elevated   WBC, HR. If possible, please document in the progress notes and discharge   summary if you are evaluating and /or treating any of the following:    The medical record reflects the following:  Risk Factors: Cellulitis of left lower extremity  Clinical Indicators: DS 10/27/2024- Pt presented with LLE swelling, redness,   found to have cellulitis  Lab values  WBC 13.0  , 93  Treatment: IV piperacillin-tazobactam, vancomycin.    Thanks,  Mary Santiago, [facility].  Options provided:  -- Sepsis due to Cellulitis of left lower extremity confirmed, present on   admission  -- Cellulitis of left lower extremity without Sepsis  -- Other - I will add my own diagnosis  -- Disagree - Not applicable / Not valid  -- Disagree - Clinically unable to determine / Unknown  -- Refer to Clinical Documentation Reviewer    PROVIDER RESPONSE TEXT:    This patient has sepsis due to Cellulitis of left lower extremity confimred,   which was present on admission.    Query created by: Mary Harris on 10/29/2024 6:42 AM      Electronically signed by:  Ilan Caballero MD 10/30/2024 10:20 AM

## 2024-10-30 NOTE — CONSULTS
General Surgery   Consult Note      Patient's Name/Date of Birth: Clement Escalera / 1980    Date: October 29, 2024     PCP: Geneva Vaz APRN - NP     Chief Complaint:   Chief Complaint   Patient presents with    Leg Pain     Left leg pain. IV drug needles in left leg and pt states dr's will not take them out ue to placement. Pt states he has flair ups of cellulitis because of this.        HPI:   Clement Escalera is a 44 y.o. male with PMH IVDU and hepC who presents for evaluation of left lower extremity cellulitis with retained needle fragment. Patient was recently admitted at [facility] 10/6-10/27 for the same issue where he left AMA. Patient has been seen several times by general surgery for this retained needle fragment and it appears that the fragment has migrated deeper into the leg based on prior imaging. Patient states that he came in today because his leg wasn't getting better. Denies any increased pain or erythema to the leg. He has several wounds on the leg which are not open or draining. Denies fevers, chills, nausea or vomiting.     Patient reports continued injection drug use (fentanyl, heroin, meth). Most recently injected yesterday prior to coming to the ED. He states that he does not inject in the left lower extremity anymore.     Patient is afebrile and hemodynamically stable. WBC 11.7. X-ray of the LLE demonstrates a small foreign body posterior to the proximal tibia.       Patient Active Problem List   Diagnosis    Fentanyl use disorder, moderate, dependence (HCC)    Cellulitis    Viral hepatitis C    Heroin use disorder, mild, in early remission (HCC)    Pain of lower extremity    Foreign body of left cornea    Closed head injury    History of hepatitis C    IV drug user    Tobacco abuse    Polysubstance use disorder    Chest pain    Cellulitis of left lower extremity    Foreign body of left lower leg    Cardiac arrest, cause unspecified    Pneumonitis due to inhalation of food and vomit (HCC)     Social Connections     Connectedness: 0   Intimate Partner Violence: Not on file   Housing Stability: Low Risk  (10/29/2024)    Housing Stability Vital Sign     Unable to Pay for Housing in the Last Year: No     Number of Times Moved in the Last Year: 0     Homeless in the Last Year: No       The patient has a family history that is negative for severe cardiovascular or respiratory issues, negative for reaction to anesthesia.     Recent Labs     10/29/24  1842   WBC 11.7*   HGB 10.4*   HCT 33.4*   MCV 76.1*   *       Recent Labs     10/29/24  1842      K 4.8   CO2 31*   BUN 11   CREATININE 0.9       Recent Labs     10/29/24  1842   ALKPHOS 116   ALT 30   AST 26   BILITOT 0.2       No intake or output data in the 24 hours ending 10/29/24 2303    I have reviewed relevant labs from this admission and my interpretation is included in my assessment and plan      Review of Systems  A complete 10 system review was performed and are otherwise negative unless mentioned in the above HPI. Specific negatives are listed below but may not include all those reviewed.    General ROS: negative obtundation, AMS  ENT ROS: negative rhinorrhea, epistaxis  Allergy and Immunology ROS: negative itchy/watery eyes or nasal congestion  Hematological and Lymphatic ROS: negative spontaneous bleeding or bruising  Endocrine ROS: negative  lethargy, mood swings, palpitations or polydipsia/polyuria  Respiratory ROS: negative sputum changes, stridor, tachypnea or wheezing  Cardiovascular ROS: negative for - loss of consciousness, murmur or orthopnea  Gastrointestinal ROS: negative for - hematochezia or hematemesis  Genito-Urinary ROS: negative for -  genital discharge or hematuria  Musculoskeletal ROS: negative for - focal weakness, gangrene  Psych/Neuro ROS: negative for - visual or auditory hallucinations, suicidal ideation      Physical exam:   /79   Pulse (!) 103   Temp 97.7 °F (36.5 °C)   Resp 17   Ht 1.702 m (5' 7\")

## 2024-10-30 NOTE — PLAN OF CARE
Problem: Safety - Adult  Goal: Free from fall injury  10/30/2024 1518 by Giovana Francis RN  Outcome: Progressing  10/30/2024 0451 by Connie Maldonado RN  Outcome: Progressing  10/30/2024 0220 by Nicola Galeana, RN  Outcome: Progressing     Problem: Chronic Conditions and Co-morbidities  Goal: Patient's chronic conditions and co-morbidity symptoms are monitored and maintained or improved  10/30/2024 1518 by Giovana Francis RN  Outcome: Progressing  10/30/2024 0451 by Connie Maldonado, RN  Outcome: Progressing  10/30/2024 0220 by Nicola Galeana, RN  Outcome: Progressing     Problem: Discharge Planning  Goal: Discharge to home or other facility with appropriate resources  10/30/2024 1518 by Giovana Francis RN  Outcome: Progressing  10/30/2024 0451 by Connie Maldonado, RN  Outcome: Progressing     Problem: Pain  Goal: Verbalizes/displays adequate comfort level or baseline comfort level  10/30/2024 0451 by Connie Maldonado, RN  Outcome: Progressing

## 2024-10-30 NOTE — ED PROVIDER NOTES
[facility] EMERGENCY DEPARTMENT  EMERGENCY DEPARTMENT ENCOUNTER        Pt Name: Clement Escalera  MRN: [id number]  Birthdate 1980  Date of evaluation: 10/29/2024  Provider: LIEN White CNP  PCP: Geneva Vaz APRN - NP  Note Started: 8:20 PM EDT 10/29/24       I have seen and evaluated this patient with my supervising physician Esteban Murillo MD.      CHIEF COMPLAINT       Chief Complaint   Patient presents with    Leg Pain     Left leg pain. IV drug needles in left leg and pt states dr's will not take them out ue to placement. Pt states he has flair ups of cellulitis because of this.        HISTORY OF PRESENT ILLNESS: 1 or more Elements     History from : Patient    Limitations to history : None    Clement Escalera is a 44 y.o. male who presents for evaluation of pain and swelling in his left leg.  The patient was recently admitted to [facility] but left AMA.  The patient reports that he is an IV drug user. He indicates that he has a needle lodged in his leg.  He periodically will get cellulitis.  The patient reports that he was admitted for treatment but indicates that they were not addressing his withdrawal and he left.  On arrival the patient is awake and alert.  He has significant swelling of his lower leg with redness radiating up to the mid thigh.  The patient indicates that he does get fevers and chills.  He denies chest pain or shortness of breath.  The patient indicates that he did use fentanyl prior to arrival.    Nursing Notes were all reviewed and agreed with or any disagreements were addressed in the HPI.    REVIEW OF SYSTEMS :      Review of Systems   Constitutional:  Positive for chills and fever.   Respiratory:  Negative for shortness of breath.    Cardiovascular:  Positive for leg swelling. Negative for chest pain.   Gastrointestinal:  Negative for nausea and vomiting.   Musculoskeletal:  Positive for myalgias.   Neurological:

## 2024-10-30 NOTE — PROGRESS NOTES
Pharmacy Consultation Note  (Antibiotic Dosing and Monitoring)    Initial consult date: 10/29/2024  Consulting physician/provider: Elgin  Drug: Vancomycin  Indication: Skin and Soft Tissue Infection    Age/  Gender Height Weight IBW  Allergy Information   44 y.o./male         Ideal body weight: 65.9 kg (145 lb 5.8 oz)  Adjusted ideal body weight: 71.8 kg (158 lb 2.9 oz)   Suboxone [buprenorphine hcl-naloxone hcl] and Ceclor [cefaclor]      Renal Function:  Recent Labs     10/29/24  1842   BUN 11   CREATININE 0.9     No intake or output data in the 24 hours ending 10/29/24 2214    Vancomycin Monitoring:  Trough:  No results for input(s): \"VANCOTROUGH\" in the last 72 hours.  Random:  No results for input(s): \"VANCORANDOM\" in the last 72 hours.    Vancomycin Administration Times:  Recent vancomycin administrations                     vancomycin (VANCOCIN) 1,250 mg in sodium chloride 0.9 % 250 mL IVPB ([id number]) (mg) 1,250 mg New Bag 10/29/24 2128    vancomycin 1000 mg IVPB in 250 mL NS addavial (mg) 1,000 mg New Bag 10/27/24 0041                    Assessment:  Patient is a 44 y.o. male who has been initiated on vancomycin  Estimated Creatinine Clearance: 107 mL/min (based on SCr of 0.9 mg/dL).  To dose vancomycin, pharmacy will be utilizing [employer] calculation software for goal AUC/KADI 400-600 mg/L-hr (predicted AUC/KADI = 600, Tr =15.8 mcg/mL)    Plan:  Loaded with vancomycin 1250mg once + giving additional 750mg at midnight.  Will continue vancomycin 1500 mg IV every 12 hours  Will check vancomycin levels when appropriate  Will continue to monitor renal function   Pharmacy to follow      Scout Freed, MaximoD, 10/29/2024 10:14 PM  JUDD: [phone]  SEY: [phone]  SJW: [phone]

## 2024-10-30 NOTE — PROGRESS NOTES
Pharmacy Consultation Note  (Antibiotic Dosing and Monitoring)    Initial consult date: 10/29/2024  Consulting physician/provider: Elgin  Drug: Vancomycin  Indication: Skin and Soft Tissue Infection    Age/  Gender Height Weight IBW  Allergy Information   44 y.o./male 170.2 cm (5' 7\") 79.4 kg (175 lb)     Ideal body weight: 66.1 kg (145 lb 11.6 oz)  Adjusted ideal body weight: 71.4 kg (157 lb 6.9 oz)   Suboxone [buprenorphine hcl-naloxone hcl] and Ceclor [cefaclor]      Renal Function:  Recent Labs     10/29/24  1842   BUN 11   CREATININE 0.9     No intake or output data in the 24 hours ending 10/30/24 0828    Vancomycin Monitoring:  Trough:  No results for input(s): \"VANCOTROUGH\" in the last 72 hours.  Random:  No results for input(s): \"VANCORANDOM\" in the last 72 hours.    Recent vancomycin administrations                     vancomycin (VANCOCIN) 750 mg in sodium chloride 0.9 % 250 mL IVPB ([id number]) (mg) 750 mg New Bag 10/29/24 2335    vancomycin (VANCOCIN) 1,250 mg in sodium chloride 0.9 % 250 mL IVPB ([id number]) (mg) 1,250 mg New Bag 10/29/24 2128                            Assessment:  Patient is a 44 y.o. male who has been initiated on vancomycin  Estimated Creatinine Clearance: 107 mL/min (based on SCr of 0.9 mg/dL).  To dose vancomycin, pharmacy will be utilizing [employer] calculation software for goal AUC/KADI 400-600 mg/L-hr (predicted AUC/KADI = 600, Tr =15.8 mcg/mL)    Plan:  Vancomycin 1500 mg IV every 12 hours  Will check vancomycin levels when appropriate  Will continue to monitor renal function   Pharmacy to follow      Ricardo Mejia, PharmD 10/30/2024 8:34 AM    Ext [phone]

## 2024-10-31 VITALS
SYSTOLIC BLOOD PRESSURE: 130 MMHG | DIASTOLIC BLOOD PRESSURE: 96 MMHG | BODY MASS INDEX: 27.47 KG/M2 | RESPIRATION RATE: 18 BRPM | HEIGHT: 67 IN | WEIGHT: 175 LBS | TEMPERATURE: 98.4 F | OXYGEN SATURATION: 100 % | HEART RATE: 107 BPM

## 2024-10-31 LAB
HIV 1+2 AB+HIV1 P24 AG SERPL QL IA: NONREACTIVE
MICROORGANISM SPEC CULT: NORMAL
MICROORGANISM SPEC CULT: NORMAL
SERVICE CMNT-IMP: NORMAL
SERVICE CMNT-IMP: NORMAL
SPECIMEN DESCRIPTION: NORMAL
SPECIMEN DESCRIPTION: NORMAL

## 2024-10-31 PROCEDURE — 6360000002 HC RX W HCPCS: Performed by: STUDENT IN AN ORGANIZED HEALTH CARE EDUCATION/TRAINING PROGRAM

## 2024-10-31 PROCEDURE — 99239 HOSP IP/OBS DSCHRG MGMT >30: CPT | Performed by: INTERNAL MEDICINE

## 2024-10-31 PROCEDURE — 2580000003 HC RX 258: Performed by: STUDENT IN AN ORGANIZED HEALTH CARE EDUCATION/TRAINING PROGRAM

## 2024-10-31 RX ORDER — DOXYCYCLINE HYCLATE 100 MG
100 TABLET ORAL 2 TIMES DAILY
Qty: 10 TABLET | Refills: 0 | Status: SHIPPED | OUTPATIENT
Start: 2024-10-31 | End: 2024-11-05

## 2024-10-31 RX ORDER — LINEZOLID 600 MG/1
600 TABLET, FILM COATED ORAL 2 TIMES DAILY
Qty: 10 TABLET | Refills: 0 | Status: SHIPPED | OUTPATIENT
Start: 2024-10-31 | End: 2024-10-31 | Stop reason: HOSPADM

## 2024-10-31 RX ADMIN — SODIUM CHLORIDE, PRESERVATIVE FREE 10 ML: 5 INJECTION INTRAVENOUS at 00:36

## 2024-10-31 RX ADMIN — SODIUM CHLORIDE 1500 MG: 9 INJECTION, SOLUTION INTRAVENOUS at 12:15

## 2024-10-31 RX ADMIN — SODIUM CHLORIDE, PRESERVATIVE FREE 10 ML: 5 INJECTION INTRAVENOUS at 09:00

## 2024-10-31 RX ADMIN — SODIUM CHLORIDE 1500 MG: 9 INJECTION, SOLUTION INTRAVENOUS at 00:39

## 2024-10-31 NOTE — CARE COORDINATION
10/31:  Update CM Note:  Pt presented to the ER for left lower ext from home. Pt left AMA from [place] 2 days prior. Pt is a possible dc.  CM requested prior authorize for [facility] for [facility] at [phone] they will advise in 24hrs [phone].  Pt's dc plan is home with family to transport.  Sw/CM will continue to follow.  Electronically signed by Ana Levi RN on 10/31/2024 at 12:10 PM

## 2024-10-31 NOTE — CARE COORDINATION
10/31:  Update CM Note:  Cm report to CPS at [phone].   Electronically signed by Ana Levi RN on 10/31/2024 at 3:16 PM

## 2024-10-31 NOTE — PLAN OF CARE
Problem: Safety - Adult  Goal: Free from fall injury  10/30/2024 2015 by Connie Maldonado RN  Outcome: Progressing  10/30/2024 1518 by Giovana Francis RN  Outcome: Progressing     Problem: Chronic Conditions and Co-morbidities  Goal: Patient's chronic conditions and co-morbidity symptoms are monitored and maintained or improved  10/30/2024 2015 by Connie Maldonado RN  Outcome: Progressing  10/30/2024 1518 by Giovana Francis RN  Outcome: Progressing     Problem: Discharge Planning  Goal: Discharge to home or other facility with appropriate resources  10/30/2024 2015 by Connie Maldonado RN  Outcome: Progressing  10/30/2024 1518 by Giovana Francis RN  Outcome: Progressing     Problem: Pain  Goal: Verbalizes/displays adequate comfort level or baseline comfort level  Outcome: Progressing  Flowsheets (Taken 10/30/2024 2000)  Verbalizes/displays adequate comfort level or baseline comfort level: Encourage patient to monitor pain and request assistance     Problem: ABCDS Injury Assessment  Goal: Absence of physical injury  Outcome: Progressing  Flowsheets (Taken 10/30/2024 1253 by Giovana Francis, RN)  Absence of Physical Injury: Implement safety measures based on patient assessment

## 2024-10-31 NOTE — DISCHARGE SUMMARY
for discharge.    Discharge Exam:    General Appearance: alert and oriented to person, place and time and in no acute distress  Skin: warm and dry  Head: normocephalic and atraumatic  Eyes: pupils equal, round, and reactive to light, extraocular eye movements intact, conjunctivae normal  Neck: neck supple and non tender without mass   Pulmonary/Chest: clear to auscultation bilaterally- no wheezes, rales or rhonchi, normal air movement, no respiratory distress  Cardiovascular: normal rate, normal S1 and S2 and no carotid bruits  Abdomen: soft, non-tender, non-distended, normal bowel sounds, no masses or organomegaly  Extremities: no cyanosis, no clubbing and no edema  Neurologic: no cranial nerve deficit and speech normal    No intake/output data recorded.  No intake/output data recorded.      LABS:  Recent Labs     10/29/24  1842 10/30/24  1315 10/30/24  1729    137 135   K 4.8 5.3* 4.4   CL 99 101 102   CO2 31* 30* 27   BUN 11 8 7   CREATININE 0.9 0.8 0.8   GLUCOSE 90 112* 104*   CALCIUM 9.0 8.3* 8.4*       Recent Labs     10/29/24  1842 10/30/24  1315   WBC 11.7* 9.2   RBC 4.39 3.88   HGB 10.4* 9.2*   HCT 33.4* 29.7*   MCV 76.1* 76.5*   MCH 23.7* 23.7*   MCHC 31.1* 31.0*   RDW 15.4* 15.6*   * 488*   MPV 9.3 10.0       No results for input(s): \"POCGLU\" in the last 72 hours.    Imaging:  Vascular duplex lower extremity venous right    Result Date: 10/30/2024  EXAMINATION: DUPLEX VENOUS ULTRASOUND OF THE RIGHT LOWER EXTREMITY 10/30/2024 10:39 am TECHNIQUE: Duplex ultrasound using B-mode/gray scaled imaging and Doppler spectral analysis and color flow was obtained of the deep venous structures of the right extremity. COMPARISON: US Venous 12/27/22 HISTORY: ORDERING SYSTEM PROVIDED HISTORY: Edema right calf FINDINGS: The rightlower extremity deep venous system was interrogated from the common femoral vein through the posterior tibial veins. There is no evidence of intraluminal filling defect. Normal         These medications were sent to [facility] [address] - P [phone] - F [phone] [address], [facility] [address]      Phone: [phone]   amoxicillin-clavulanate 875-125 MG per tablet  doxycycline hyclate 100 MG tablet           35 minutes was spent in preparing discharge papers, discussing discharge with patient, medication review, etc.    Signed:  Electronically signed by Dinora Whiteside MD on 10/31/2024 at 3:15 PM

## 2024-10-31 NOTE — PROGRESS NOTES
This RN was alerted by another staff member that the pt was at the elevator leaving unit. This RN went and spoke with the pt and reiterated, yet again, that the pt is not able to leave the facility with IV site in place. Pt returned to room for IV removal and agreed to wait for discharge instructions as antibiotics were already delivered to room by pharmacy. The pt packed all belongings for discharge. Pt adamantly refused to allow this RN to schedule a follow up appointment to become established with a PCP. Pt states that he will \"call [facility] or just go to an urgent care like I always do\". Discharge instructions provided and reviewed with the pt. Pt then became verbal with this RN in the landrum way stating \"I don't know what the big deal is. God forbid I leave with an IV in. I mean, its definitely safer for me. Im not trained with needles, obviously.\". Pt then left the unit with discharge instructions and all belongings.

## 2024-10-31 NOTE — PROGRESS NOTES
MAR[facility] PROGRESS NOTE      Chief complaint: Follow-up of left lower extremity cellulitis    The patient is a 44 y.o. male with history of hepatitis C, injection drug use, previously presented at [facility] on 10/25 for similar complaint found to have foreign body (left lower leg x-ray showing migration of metallic linear foreign body more proximal and adjacent to posterior tibial with accompanying generalized soft tissue swelling of leg) that has migrated deeper toward the posterior aspect of the tibia although likely unrelated to the retained foreign body per Surgery evaluation then signed out AMA on 10/27 apparently due to not getting relief from opioid withdrawal, presented on 10/29 with left lower extremity swelling, redness, pain for 5 days accompanied by fever and chills.  On admission, he was afebrile and hemodynamically stable with leukocytosis of 13,000.  Chest x-ray showed no signs of acute cardiopulmonary disease.  CTA of left lower extremity showed extensive subcutaneous edema and skin thickening in the distal left lower extremity with no drainable fluid collection and no evidence of active extravasation.  Blood cultures from 10/25 and 10/26 and 10/29 showed no growth to date.  MRSA nares culture on 10/26 was negative for MRSA.  Vancomycin was started on admission.     Subjective: Patient was seen and examined. No chills, no abdominal pain, no diarrhea, no rash, no itching. Afebrile.      Objective:    Vitals:    10/31/24 0758   BP: 121/82   Pulse: (!) 111   Resp: 19   Temp: 98.1 °F (36.7 °C)   SpO2: 100%     Constitutional: Alert, not in distress  Respiratory: Clear breath sounds, no crackles, no wheezes  Cardiovascular: Regular rate and rhythm, no murmurs  Gastrointestinal: Bowel sounds present, soft, nontender  Skin: Warm and dry, no active dermatoses  Musculoskeletal: Left lower leg swollen and slightly erythematous with receding erythema     Labs, imaging, and medical records/notes were personally

## 2024-10-31 NOTE — PROGRESS NOTES
GENERAL SURGERY  DAILY PROGRESS NOTE  10/31/2024    CHIEF COMPLAINT:  Chief Complaint   Patient presents with    Leg Pain     Left leg pain. IV drug needles in left leg and pt states dr's will not take them out ue to placement. Pt states he has flair ups of cellulitis because of this.        SUBJECTIVE:  No issues overnight. Patient still having some pain in his left leg.     OBJECTIVE:  /82   Pulse (!) 111   Temp 98.1 °F (36.7 °C) (Oral)   Resp 19   Ht 1.702 m (5' 7\")   Wt 79.4 kg (175 lb)   SpO2 100%   BMI 27.41 kg/m²     GENERAL:  NAD. A&Ox3.  LUNGS:  No increased work of breathing.  CARDIOVASCULAR: RR  ABDOMEN:  Soft, non-distended, non-tender. No guarding, rigidity, rebound.  Skin: Erythema and induration of LLE.    ASSESSMENT/PLAN:  44 y.o. male with left lower extremity cellulitis and retained needle fragment    Plan:   Patient is not interested in surgical intervention to remove the foreign body at this time  Continue abx per primary     Ani Maloney MD  Surgery Resident PGY-1  10/31/2024  8:32 AM

## 2024-10-31 NOTE — PROGRESS NOTES
CLINICAL PHARMACY NOTE: MEDS TO BEDS    Total # of Prescriptions Filled: 2   The following medications were delivered to the patient:  Augmentin 875 tabs  Doxycycline hyc 100mg tabs    Additional Documentation:  To patients

## 2024-10-31 NOTE — DISCHARGE INSTRUCTIONS
Be compliant with medication.  Complete antibiotic.  Follow-up with PCP      Your information:  Name: Clement Escalera  : 1980    Your instructions:    Take all medications as prescribed. Be sure to schedule and all follow up appointments    What to do after you leave the hospital:    Recommended diet: regular diet    Recommended activity: activity as tolerated        The following personal items were collected during your admission and were returned to you:    Belongings  Dental Appliances: Uppers  Vision - Corrective Lenses: None  Hearing Aid: None  Clothing: Shirt, Pants, Footwear, Hat  Jewelry: Ring  Body Piercings Removed: No  Electronic Devices: Cell Phone, , Ear Phones/Buds, Tablet  Weapons (Notify Protective Services/Security): None  Other Valuables: Money, Wallet, Keys  Home Medications: None  Valuables Given To: Patient  Provide Name(s) of Who Valuable(s) Were Given To: Clement  Patient approves for provider to speak to responsible person post operatively: No    Information obtained by:  By signing below, I understand that if any problems occur once I leave the hospital I am to contact 911 in case of an emergency or  go to the nearest ER. I understand and acknowledge receipt of the instructions indicated above.

## 2024-10-31 NOTE — PROGRESS NOTES
[facility] Hospitalist Progress Note    Admitting Date and Time: 10/29/2024  3:52 PM  Admit Dx: IVDU (intravenous drug user) [F19.90]  Cellulitis of left lower extremity [L03.116]  Edema of right lower extremity [R60.0]  Viral hepatitis A without hepatic coma [B15.9]    Synopsis: 43 yo male w/ hx of IVDU (heroin/fentanyl, meth), Hep C, tobacco use who p/w 5 days of LLE swelling, redness, and pain assoc w/ fever/chills and subsequent development of CP/dyspnea today. Denies abd pain, n/v/d. Was admitted at [facility] and left AMA on 10/27 due to going through opioid withdrawal in the hospital and feeling like he was not being treated for his withdrawal sxs. Returned to the ED 10/29 with worsening spreading of redness up to the left thigh.     Subjective:  Patient seen and examined at bedside.   Events reviewed.   Patient asking about prescribing Methadone, but he is currently on COWS protocol with suboxone. Patient states he uses fentanyl outpatient.     ROS: denies fever, chills, cp, sob, n/v, HA unless stated above.      nicotine  1 patch TransDERmal Daily    melatonin  6 mg Oral Nightly    sodium chloride flush  5-40 mL IntraVENous 2 times per day    enoxaparin  40 mg SubCUTAneous Daily    vancomycin  1,500 mg IntraVENous Q12H     buprenorphine-naloxone, 2 tablet, PRN  sodium chloride flush, 5-40 mL, PRN  sodium chloride, , PRN  potassium chloride, 40 mEq, PRN   Or  potassium alternative oral replacement, 40 mEq, PRN   Or  potassium chloride, 10 mEq, PRN  magnesium sulfate, 2,000 mg, PRN  polyethylene glycol, 17 g, Daily PRN  acetaminophen, 650 mg, Q6H PRN   Or  acetaminophen, 650 mg, Q6H PRN  ketorolac, 30 mg, Q6H PRN         Objective:    /82   Pulse (!) 111   Temp 98.1 °F (36.7 °C) (Oral)   Resp 19   Ht 1.702 m (5' 7\")   Wt 79.4 kg (175 lb)   SpO2 100%   BMI 27.41 kg/m²     General Appearance: alert and oriented to person, place and time and in no acute distress  Skin: warm and dry  Head:  normocephalic and atraumatic  Eyes: pupils equal, round, and reactive to light, extraocular eye movements intact, conjunctivae normal  Neck: neck supple and non tender without mass   Pulmonary/Chest: clear to auscultation bilaterally- no wheezes, rales or rhonchi, normal air movement, no respiratory distress  Cardiovascular: normal rate, normal S1 and S2 and no carotid bruits  Abdomen: soft, non-tender, non-distended, normal bowel sounds, no masses or organomegaly  Extremities: LLE erythema/cellulitis improving from initial outline   Neurologic: no cranial nerve deficit and speech normal  Skin: multiple puncture wounds         Recent Labs     10/29/24  1842 10/30/24  1315 10/30/24  1729    137 135   K 4.8 5.3* 4.4   CL 99 101 102   CO2 31* 30* 27   BUN 11 8 7   CREATININE 0.9 0.8 0.8   GLUCOSE 90 112* 104*   CALCIUM 9.0 8.3* 8.4*       Recent Labs     10/29/24  1842 10/30/24  1315   WBC 11.7* 9.2   RBC 4.39 3.88   HGB 10.4* 9.2*   HCT 33.4* 29.7*   MCV 76.1* 76.5*   MCH 23.7* 23.7*   MCHC 31.1* 31.0*   RDW 15.4* 15.6*   * 488*   MPV 9.3 10.0       Radiology:   Vascular duplex lower extremity venous right   Final Result   No sonographic evidence of DVT.         CTA LOWER EXTREMITY LEFT W CONTRAST   Final Result   Extensive subcutaneous edema and skin thickening, particularly in the distal   left lower extremity. No drainable fluid collection. No evidence of active   extravasation.         XR CHEST PORTABLE   Final Result   No sign of acute cardiopulmonary disease.         Vascular duplex lower extremity venous left   Final Result   No evidence of DVT in the left lower extremity.              Assessment:    Principal Problem:    Cellulitis of left lower extremity  Active Problems:    Polysubstance use disorder    Chest pain    Foreign body of left lower leg    IVDU (intravenous drug user)  Resolved Problems:    * No resolved hospital problems. *      Plan:  Left lower extremity cellulitis - WBC 11.7,

## 2024-10-31 NOTE — PROGRESS NOTES
[facility]  PROGRESS NOTE  ATTENDING NOTE      Patient declining operative intervention to removed retained foreign body.    Surgery will s/o, please call if needed    Rola Colón MD, MSc, FACS  10/31/2024  2:07 PM

## 2024-10-31 NOTE — PROGRESS NOTES
Dr. Whiteside notified via perfect serve that the pt is requesting suboxone be changed to methadone.

## 2024-10-31 NOTE — PROGRESS NOTES
The patient was in the room sitting on the side of his bed clearing out the window. The patient had a short conversation with the . It was apparent that there were some things that the patient was thinking about. The  offered the patient some reading materials which he declined. The patient was receptive for prayer and stated that I could back to visit at another time. The patient appeared to be encouraged after the visit.    If additional support is needed please reach out to [facility] (ext [phone]).    Rev STACIA Llanos MDIV,

## 2024-10-31 NOTE — PROGRESS NOTES
Pharmacy Consultation Note  (Antibiotic Dosing and Monitoring)    Initial consult date: 10/29/2024  Consulting physician/provider: Elgin  Drug: Vancomycin  Indication: Skin and Soft Tissue Infection    Age/  Gender Height Weight IBW  Allergy Information   44 y.o./male 170.2 cm (5' 7\") 79.4 kg (175 lb)     Ideal body weight: 66.1 kg (145 lb 11.6 oz)  Adjusted ideal body weight: 71.4 kg (157 lb 6.9 oz)   Suboxone [buprenorphine hcl-naloxone hcl] and Ceclor [cefaclor]      Renal Function:  Recent Labs     10/29/24  1842 10/30/24  1315 10/30/24  1729   BUN 11 8 7   CREATININE 0.9 0.8 0.8     No intake or output data in the 24 hours ending 10/31/24 0945    Vancomycin Monitoring:  Trough:  No results for input(s): \"VANCOTROUGH\" in the last 72 hours.  Random:  No results for input(s): \"VANCORANDOM\" in the last 72 hours.    Recent vancomycin administrations                     vancomycin (VANCOCIN) 1,500 mg in sodium chloride 0.9 % 250 mL IVPB ([id number]) (mg) 1,500 mg New Bag 10/31/24 0039     1,500 mg New Bag 10/30/24 1331     1,500 mg New Bag  1107    vancomycin (VANCOCIN) 750 mg in sodium chloride 0.9 % 250 mL IVPB ([id number]) (mg) 750 mg New Bag 10/29/24 2335    vancomycin (VANCOCIN) 1,250 mg in sodium chloride 0.9 % 250 mL IVPB ([id number]) (mg) 1,250 mg New Bag 10/29/24 2128        Assessment:  Patient is a 44 y.o. male who has been initiated on vancomycin  Estimated Creatinine Clearance: 120 mL/min (based on SCr of 0.8 mg/dL).  To dose vancomycin, pharmacy will be utilizing [employer] calculation software for goal AUC/KADI 400-600 mg/L-hr (predicted AUC/KADI = 600, Tr =15.8 mcg/mL)  Random level ordered 11/1 with morning labs    Plan:  Vancomycin 1500 mg IV every 12 hours  Will check vancomycin levels when appropriate  Will continue to monitor renal function   Pharmacy to follow    Thank you for this consult,    Ricardo Mejia, PharmD 10/31/2024 9:45 AM    Ext [phone]

## 2024-10-31 NOTE — PROGRESS NOTES
Spoke with Dr. Vaca while on unit who states that the pt is okay to discharge from ID stand point on prescribed oral abx. Per Dr. Vaca, no need to follow up with her in the office or clinic; just follow up with PCP in 1 week. Dr. Whiteside notified via perfect serve

## 2024-11-03 LAB
MICROORGANISM SPEC CULT: NORMAL
MICROORGANISM SPEC CULT: NORMAL
SERVICE CMNT-IMP: NORMAL
SERVICE CMNT-IMP: NORMAL
SPECIMEN DESCRIPTION: NORMAL
SPECIMEN DESCRIPTION: NORMAL